# Patient Record
Sex: MALE | Race: WHITE
[De-identification: names, ages, dates, MRNs, and addresses within clinical notes are randomized per-mention and may not be internally consistent; named-entity substitution may affect disease eponyms.]

---

## 2018-03-04 ENCOUNTER — HOSPITAL ENCOUNTER (INPATIENT)
Dept: HOSPITAL 17 - NEPE | Age: 73
LOS: 3 days | Discharge: HOME | DRG: 308 | End: 2018-03-07
Attending: FAMILY MEDICINE | Admitting: FAMILY MEDICINE
Payer: MEDICARE

## 2018-03-04 VITALS
RESPIRATION RATE: 20 BRPM | HEART RATE: 92 BPM | DIASTOLIC BLOOD PRESSURE: 109 MMHG | SYSTOLIC BLOOD PRESSURE: 171 MMHG | TEMPERATURE: 96.5 F | OXYGEN SATURATION: 97 %

## 2018-03-04 VITALS — BODY MASS INDEX: 31.44 KG/M2 | WEIGHT: 207.45 LBS | HEIGHT: 68 IN

## 2018-03-04 VITALS
RESPIRATION RATE: 18 BRPM | HEART RATE: 86 BPM | OXYGEN SATURATION: 96 % | TEMPERATURE: 97.7 F | SYSTOLIC BLOOD PRESSURE: 159 MMHG | DIASTOLIC BLOOD PRESSURE: 94 MMHG

## 2018-03-04 VITALS
HEART RATE: 88 BPM | OXYGEN SATURATION: 97 % | RESPIRATION RATE: 20 BRPM | SYSTOLIC BLOOD PRESSURE: 151 MMHG | DIASTOLIC BLOOD PRESSURE: 67 MMHG

## 2018-03-04 VITALS — OXYGEN SATURATION: 96 %

## 2018-03-04 VITALS
SYSTOLIC BLOOD PRESSURE: 121 MMHG | TEMPERATURE: 97.9 F | HEART RATE: 63 BPM | OXYGEN SATURATION: 96 % | DIASTOLIC BLOOD PRESSURE: 55 MMHG | RESPIRATION RATE: 18 BRPM

## 2018-03-04 VITALS
DIASTOLIC BLOOD PRESSURE: 79 MMHG | SYSTOLIC BLOOD PRESSURE: 152 MMHG | HEART RATE: 88 BPM | RESPIRATION RATE: 18 BRPM | OXYGEN SATURATION: 98 %

## 2018-03-04 VITALS
RESPIRATION RATE: 20 BRPM | DIASTOLIC BLOOD PRESSURE: 91 MMHG | HEART RATE: 81 BPM | OXYGEN SATURATION: 97 % | SYSTOLIC BLOOD PRESSURE: 146 MMHG

## 2018-03-04 VITALS
TEMPERATURE: 97.7 F | SYSTOLIC BLOOD PRESSURE: 154 MMHG | HEART RATE: 85 BPM | RESPIRATION RATE: 20 BRPM | OXYGEN SATURATION: 94 % | DIASTOLIC BLOOD PRESSURE: 91 MMHG

## 2018-03-04 VITALS — OXYGEN SATURATION: 98 %

## 2018-03-04 VITALS — HEART RATE: 86 BPM

## 2018-03-04 DIAGNOSIS — Z88.0: ICD-10-CM

## 2018-03-04 DIAGNOSIS — I47.2: ICD-10-CM

## 2018-03-04 DIAGNOSIS — E11.9: ICD-10-CM

## 2018-03-04 DIAGNOSIS — Z96.651: ICD-10-CM

## 2018-03-04 DIAGNOSIS — I11.0: ICD-10-CM

## 2018-03-04 DIAGNOSIS — Z96.642: ICD-10-CM

## 2018-03-04 DIAGNOSIS — N28.1: ICD-10-CM

## 2018-03-04 DIAGNOSIS — Z79.84: ICD-10-CM

## 2018-03-04 DIAGNOSIS — Z82.49: ICD-10-CM

## 2018-03-04 DIAGNOSIS — H35.30: ICD-10-CM

## 2018-03-04 DIAGNOSIS — M51.36: ICD-10-CM

## 2018-03-04 DIAGNOSIS — I48.91: Primary | ICD-10-CM

## 2018-03-04 DIAGNOSIS — I50.23: ICD-10-CM

## 2018-03-04 DIAGNOSIS — I42.8: ICD-10-CM

## 2018-03-04 DIAGNOSIS — J98.11: ICD-10-CM

## 2018-03-04 DIAGNOSIS — M48.00: ICD-10-CM

## 2018-03-04 DIAGNOSIS — R59.9: ICD-10-CM

## 2018-03-04 DIAGNOSIS — Z95.810: ICD-10-CM

## 2018-03-04 DIAGNOSIS — Z87.891: ICD-10-CM

## 2018-03-04 LAB
BASOPHILS # BLD AUTO: 0 TH/MM3 (ref 0–0.2)
BASOPHILS NFR BLD: 0.7 % (ref 0–2)
BUN SERPL-MCNC: 24 MG/DL (ref 7–18)
CALCIUM SERPL-MCNC: 9.2 MG/DL (ref 8.5–10.1)
CHLORIDE SERPL-SCNC: 107 MEQ/L (ref 98–107)
CREAT SERPL-MCNC: 1.33 MG/DL (ref 0.6–1.3)
EOSINOPHIL # BLD: 0.2 TH/MM3 (ref 0–0.4)
EOSINOPHIL NFR BLD: 3.2 % (ref 0–4)
ERYTHROCYTE [DISTWIDTH] IN BLOOD BY AUTOMATED COUNT: 13.3 % (ref 11.6–17.2)
GFR SERPLBLD BASED ON 1.73 SQ M-ARVRAT: 53 ML/MIN (ref 89–?)
GLUCOSE SERPL-MCNC: 161 MG/DL (ref 74–106)
HCO3 BLD-SCNC: 34 MEQ/L (ref 21–32)
HCT VFR BLD CALC: 44.5 % (ref 39–51)
HGB BLD-MCNC: 15.3 GM/DL (ref 13–17)
INR PPP: 1 RATIO
LYMPHOCYTES # BLD AUTO: 0.9 TH/MM3 (ref 1–4.8)
LYMPHOCYTES NFR BLD AUTO: 12.9 % (ref 9–44)
MCH RBC QN AUTO: 30.6 PG (ref 27–34)
MCHC RBC AUTO-ENTMCNC: 34.3 % (ref 32–36)
MCV RBC AUTO: 89.3 FL (ref 80–100)
MONOCYTE #: 0.7 TH/MM3 (ref 0–0.9)
MONOCYTES NFR BLD: 10.2 % (ref 0–8)
NEUTROPHILS # BLD AUTO: 5.2 TH/MM3 (ref 1.8–7.7)
NEUTROPHILS NFR BLD AUTO: 73 % (ref 16–70)
PLATELET # BLD: 156 TH/MM3 (ref 150–450)
PMV BLD AUTO: 9.3 FL (ref 7–11)
PROTHROMBIN TIME: 10.6 SEC (ref 9.8–11.6)
RBC # BLD AUTO: 4.98 MIL/MM3 (ref 4.5–5.9)
SODIUM SERPL-SCNC: 143 MEQ/L (ref 136–145)
TROPONIN I SERPL-MCNC: (no result) NG/ML (ref 0.02–0.05)
TROPONIN I SERPL-MCNC: 0.03 NG/ML (ref 0.02–0.05)
WBC # BLD AUTO: 7.1 TH/MM3 (ref 4–11)

## 2018-03-04 PROCEDURE — 84439 ASSAY OF FREE THYROXINE: CPT

## 2018-03-04 PROCEDURE — 82550 ASSAY OF CK (CPK): CPT

## 2018-03-04 PROCEDURE — 82948 REAGENT STRIP/BLOOD GLUCOSE: CPT

## 2018-03-04 PROCEDURE — 76700 US EXAM ABDOM COMPLETE: CPT

## 2018-03-04 PROCEDURE — 85027 COMPLETE CBC AUTOMATED: CPT

## 2018-03-04 PROCEDURE — 96374 THER/PROPH/DIAG INJ IV PUSH: CPT

## 2018-03-04 PROCEDURE — 94618 PULMONARY STRESS TESTING: CPT

## 2018-03-04 PROCEDURE — 80048 BASIC METABOLIC PNL TOTAL CA: CPT

## 2018-03-04 PROCEDURE — 78452 HT MUSCLE IMAGE SPECT MULT: CPT

## 2018-03-04 PROCEDURE — 82552 ASSAY OF CPK IN BLOOD: CPT

## 2018-03-04 PROCEDURE — 85025 COMPLETE CBC W/AUTO DIFF WBC: CPT

## 2018-03-04 PROCEDURE — 71045 X-RAY EXAM CHEST 1 VIEW: CPT

## 2018-03-04 PROCEDURE — A9502 TC99M TETROFOSMIN: HCPCS

## 2018-03-04 PROCEDURE — 71275 CT ANGIOGRAPHY CHEST: CPT

## 2018-03-04 PROCEDURE — 93005 ELECTROCARDIOGRAM TRACING: CPT

## 2018-03-04 PROCEDURE — 84484 ASSAY OF TROPONIN QUANT: CPT

## 2018-03-04 PROCEDURE — 83880 ASSAY OF NATRIURETIC PEPTIDE: CPT

## 2018-03-04 PROCEDURE — 93306 TTE W/DOPPLER COMPLETE: CPT

## 2018-03-04 PROCEDURE — 85610 PROTHROMBIN TIME: CPT

## 2018-03-04 PROCEDURE — 85730 THROMBOPLASTIN TIME PARTIAL: CPT

## 2018-03-04 PROCEDURE — 93017 CV STRESS TEST TRACING ONLY: CPT

## 2018-03-04 PROCEDURE — 84443 ASSAY THYROID STIM HORMONE: CPT

## 2018-03-04 RX ADMIN — INSULIN ASPART SCH: 100 INJECTION, SOLUTION INTRAVENOUS; SUBCUTANEOUS at 21:00

## 2018-03-04 RX ADMIN — TAMSULOSIN HYDROCHLORIDE SCH MG: 0.4 CAPSULE ORAL at 21:34

## 2018-03-04 RX ADMIN — ENOXAPARIN SODIUM SCH MG: 100 INJECTION SUBCUTANEOUS at 12:10

## 2018-03-04 RX ADMIN — METOPROLOL TARTRATE SCH MG: 100 TABLET, FILM COATED ORAL at 21:34

## 2018-03-04 RX ADMIN — METOPROLOL TARTRATE SCH MG: 25 TABLET, FILM COATED ORAL at 21:34

## 2018-03-04 RX ADMIN — INSULIN ASPART SCH: 100 INJECTION, SOLUTION INTRAVENOUS; SUBCUTANEOUS at 17:00

## 2018-03-04 RX ADMIN — INSULIN ASPART SCH: 100 INJECTION, SOLUTION INTRAVENOUS; SUBCUTANEOUS at 12:00

## 2018-03-04 RX ADMIN — Medication SCH ML: at 21:35

## 2018-03-04 NOTE — PD
Data


Data


Last Documented VS





Vital Signs








  Date Time  Temp Pulse Resp B/P (MAP) Pulse Ox O2 Delivery O2 Flow Rate FiO2


 


3/4/18 06:54     98 Nasal Cannula 2.00 


 


3/4/18 06:39  88 18     


 


3/4/18 06:25 96.5       








Orders





 Orders


Electrocardiogram (3/4/18 06:42)


Complete Blood Count With Diff (3/4/18 06:42)


Basic Metabolic Panel (Bmp) (3/4/18 06:42)


Ckmb (Isoenzyme) Profile (3/4/18 06:42)


Troponin I (3/4/18 06:42)


Chest, Single Ap (3/4/18 06:42)


Iv Access Insert/Monitor (3/4/18 06:42)


Ecg Monitoring (3/4/18 06:42)


Oxygen Administration (3/4/18 06:42)


Oximetry (3/4/18 06:42)


B-Type Natriuretic Peptide (3/4/18 06:42)


CKMB (3/4/18 07:00)


CKMB% (3/4/18 07:00)


Ct Pulmonary Angiogram (3/4/18 08:19)


Furosemide Inj (Lasix Inj) (3/4/18 08:30)


Iohexol 350 Inj (Omnipaque 350 Inj) (3/4/18 06:22)


Admit Order (Ed Use Only) (3/4/18 )


Cardiac Monitor / Telemetry NEELA.Q8H (3/4/18 08:49)


Vital Signs (Adult) Q4H (3/4/18 08:49)


Diet Heart Healthy (3/4/18 Breakfast)


Activity Oob With Assistance (3/4/18 08:49)





Labs





Laboratory Tests








Test


  3/4/18


07:00


 


White Blood Count 7.1 TH/MM3 


 


Red Blood Count 4.98 MIL/MM3 


 


Hemoglobin 15.3 GM/DL 


 


Hematocrit 44.5 % 


 


Mean Corpuscular Volume 89.3 FL 


 


Mean Corpuscular Hemoglobin 30.6 PG 


 


Mean Corpuscular Hemoglobin


Concent 34.3 % 


 


 


Red Cell Distribution Width 13.3 % 


 


Platelet Count 156 TH/MM3 


 


Mean Platelet Volume 9.3 FL 


 


Neutrophils (%) (Auto) 73.0 % 


 


Lymphocytes (%) (Auto) 12.9 % 


 


Monocytes (%) (Auto) 10.2 % 


 


Eosinophils (%) (Auto) 3.2 % 


 


Basophils (%) (Auto) 0.7 % 


 


Neutrophils # (Auto) 5.2 TH/MM3 


 


Lymphocytes # (Auto) 0.9 TH/MM3 


 


Monocytes # (Auto) 0.7 TH/MM3 


 


Eosinophils # (Auto) 0.2 TH/MM3 


 


Basophils # (Auto) 0.0 TH/MM3 


 


CBC Comment DIFF FINAL 


 


Differential Comment  


 


Blood Urea Nitrogen 24 MG/DL 


 


Creatinine 1.33 MG/DL 


 


Random Glucose 161 MG/DL 


 


Calcium Level 9.2 MG/DL 


 


Sodium Level 143 MEQ/L 


 


Potassium Level 4.2 MEQ/L 


 


Chloride Level 107 MEQ/L 


 


Carbon Dioxide Level 34.0 MEQ/L 


 


Anion Gap 2 MEQ/L 


 


Estimat Glomerular Filtration


Rate 53 ML/MIN 


 


 


Total Creatine Kinase 111 U/L 


 


Creatine Kinase MB 1.5 NG/ML 


 


Troponin I


  LESS THAN 0.02


NG/ML


 


B-Type Natriuretic Peptide 230 PG/ML 











MDM


Medical Record Reviewed:  Yes


Supervised Visit with SHIV:  No


Narrative Course


Please refer to the outgoing provider note:





The patient will be admitted for treatment of CHF, new diagnosis for him he 

also has new A. fib with a normal rate.  The time of reassessment he was 

somewhat dyspneic at rest.  Lasix ordered.  A CT pulmonary injury was also 

ordered which shows no PE however there is compressive atelectasis at the right 

base.





CBC & BMP Diagram


3/4/18 07:00








Calcium Level 9.2








Last Impressions








CT Angiography 3/4/18 0819 Signed





Impressions: 





 Service Date/Time:  Sunday, March 4, 2018 08:43 - CONCLUSION:  Marked 

elevation 





 right hemidiaphragm with compressive atelectasis right base. Negative for 





 central pulmonary emboli Nonspecific adenopathy.      Valerio Singh MD  FACR


 


Chest X-Ray 3/4/18 0642 Signed





Impressions: 





 Service Date/Time:  Sunday, March 4, 2018 06:48 - CONCLUSION:  Stable 





 cardiomegaly.  The lungs are clear.     Segundo Patterson MD 





Case discussed with family medicine resident for Dr Ellis


Diagnosis





 Primary Impression:  


 CHF (congestive heart failure)


 Qualified Codes:  I50.9 - Heart failure, unspecified


 Additional Impression:  


 Atrial fibrillation


 Qualified Codes:  I48.91 - Unspecified atrial fibrillation





Admitting Information


Admitting Physician Requests:  Observation











Antoine Wallace MD Mar 4, 2018 10:36

## 2018-03-04 NOTE — EKG
Date Performed: 03/04/2018       Time Performed: 06:38:05

 

PTAGE:      72 years

 

EKG:      ATRIAL FIBRILLATION WITH ABERRANT CONDUCTION OR VENTRICULAR PREMATURE COMPLEXES MARKED LEFT
 AXIS DEVIATION POSSIBLE ANTERIOR MYOCARDIAL INFARCTION ABNORMAL ECG

 

PREVIOUS TRACING       : 02/18/2016 19.58

 

DOCTOR:   Mansoor Mehta  Interpretating Date/Time  03/04/2018 09:02:11

## 2018-03-04 NOTE — RADRPT
EXAM DATE/TIME:  03/04/2018 06:48 

 

[facility] COMPARISON:     

CHEST SINGLE AP, February 18, 2016, 14:12.

 

                     

INDICATIONS :     

Shortness of breath.

                     

 

MEDICAL HISTORY :     

Hypertension.  Diabetes mellitus type II.        

 

SURGICAL HISTORY :     

None.   

 

ENCOUNTER:     

Initial                                        

 

ACUITY:     

1 day      

 

PAIN SCORE:     

0/10

 

LOCATION:     

Bilateral chest 

 

FINDINGS:     

A single view of the chest demonstrates the lungs to be symmetrically aerated without evidence of mas
s, infiltrate or effusion. Chronic elevation of the right hemidiaphragm. Heart is mildly enlarged but
 stable. Pacing device overlies the left chest. Osseous structures are intact. 

 

CONCLUSION:     

Stable cardiomegaly.  The lungs are clear.

 

 

 

 Segundo Patterson MD on March 04, 2018 at 6:59

## 2018-03-04 NOTE — ECHRPT
Indication:   heart failure

 

 CONCLUSIONS

 Normal left ventricular size. 

 The left ventricular systolic function is severely reduced with an estimated ejection fraction in th
e range of 

 25-30% Global hypokinesis. 

 

 Trace-to-mild mitral valve regurgitation. 

 

 

 There is mild tricuspid valve regurgitation. 

 The estimated pulmonary arterial pressure is 66.6 mmHg. 

 

 BP:        /         HR:                          Rhythm:

 

 MEASUREMENTS  (Male / Female) Normal Values       Technical Quality:Fair

 2D ECHO

 LV Diastolic Diameter PLAX        5.3 cm                4.2 - 5.9 / 3.9 - 5.3 cm

 LV Systolic Diameter PLAX         4.8 cm                

 IVS Diastolic Thickness           2.1 cm                0.6 - 1.0 / 0.6 - 0.9 cm

 LVPW Diastolic Thickness          1.2 cm                0.6 - 1.0 / 0.6 - 0.9 cm

 LV Relative Wall Thickness        0.6                   

 RV Internal Dim ED PLAX           3.5 cm                

 

 M-MODE

 Aortic Root Diameter MM           4.8 cm                

 LA Systolic Diameter MM           4.5 cm                

 LA Ao Ratio MM                    0.9                   

 AV Cusp Separation MM             2.6 cm                

 

 DOPPLER

 LV E' Lateral Velocity            10.5 cm/s             

 LV E' Septal Velocity             8.9 cm/s              

 TR Peak Velocity                  376.0 cm/s            

 TR Peak Gradient                  56.6 mmHg             

 Right Atrial Pressure             10.0 mmHg             

 Pulmonary Artery Systolic Pressu  66.6 mmHg             

 Right Ventricular Systolic Press  66.6 mmHg             

 

 

 FINDINGS

 

 LEFT VENTRICLE

 Normal left ventricular size. 

 The left ventricular systolic function is severely reduced with an estimated ejection fraction in th
e range of 

 25-30%. 

 

 RIGHT VENTRICLE

 Normal right ventricular size and systolic function.  

 

 LEFT ATRIUM

 The left atrial size is normal.  

 

 RIGHT ATRIUM

 The right atrial size is normal.  

 

 ATRIAL SEPTUM

 Normal atrial septal thickness without atrial level shunting by limited color doppler interrogation.
  

 

 AORTA

 The aortic root and proximal ascending aorta are normal in size on limited imaging.  

 

 MITRAL VALVE

 Structurally normal mitral valve. 

 Trace-to-mild mitral valve regurgitation. 

 

 AORTIC VALVE

 Trileaflet aortic valve. No aortic valve stenosis or regurgitation. 

 

 TRICUSPID VALVE

 Structurally normal tricuspid valve. 

 There is mild tricuspid valve regurgitation. 

 The estimated pulmonary arterial pressure is 66.6 mmHg. 

 

 PULMONARY VALVE

 No pulmonary valve regurgitation or stenosis.  

 

 VESSELS

 The inferior vena cava is normal in size.  

 

 PERICARDIUM

 No pericardial effusion.  

 

 

 

 

  Brandy Herzog MD, FACC

  (Electronically Signed)

  Final Date:04 March 2018 15:09

## 2018-03-04 NOTE — RADRPT
EXAM DATE/TIME:  03/04/2018 19:11 

 

[facility] COMPARISON:     

No previous studies available for comparison.

        

 

 

INDICATIONS :     

Abdominal distention.

                     

 

MEDICAL HISTORY :     

Congestive heart failure. Hypertension.   Cardiomyopathy. Anticoagulant therapy. Irregular heartbeat.
 Degernerative disc disease. Spinal stenosis. Diabetes. Elevated liver enzymes.

 

SURGICAL HISTORY :     

Umbilical hernia repair. Hemorrhoidectomy.   Internal defibrillator. Bilateral cataract removal. Sali
vary gland surgery. Vasectomy. Left shoulder surgery. Spinal stenosis repair. Bilateral hand surgery.
 Left hip replacement. Right knee surgery. Stomach wall repair. Varicose vein surgery.

 

ENCOUNTER:     

Initial

 

ACUITY:     

1 day

 

PAIN SCORE:     

3/10

 

LOCATION:     

Bilateral upper quadrant 

                     

MEASUREMENTS:     

 

LIVER:     

15.3 cm length 

 

COMMON DUCT:     

Non-visualized

 

RIGHT KIDNEY:     

12.2 x 5.5 x 6.7 cm

 

LEFT KIDNEY:     

10.9 x 4.1 x 5.6 cm

 

SPLEEN:     

12.8  cm length

 

AORTA:     

1.7cm maximal      

 

FINDINGS:     

Exam is limited by bowel gas. Pancreas, common bile duct, aorta, IVC and portions of the liver are no
t visualized. Multiple right renal cysts measuring up to 6.5 cm upper pole and 5.5 cm lower pole. No 
hydronephrosis. Left kidney unremarkable. No focal liver abnormalities. Portal vein flowed normal dir
ection.

 

CONCLUSION:     

1. Limited exam due to gaseous bowel. No acute findings.

Multiple right renal cysts. 

 

 

 Brayden Suárez MD on March 04, 2018 at 21:43           

Board Certified Radiologist.

 This report was verified electronically.

## 2018-03-04 NOTE — RADRPT
EXAM DATE/TIME:  03/04/2018 08:43 

 

[facility] COMPARISON:     

No previous studies available for comparison.

 

 

INDICATIONS :     

Shortness of breath.

                      

 

IV CONTRAST:     

74 cc Omnipaque 350 (iohexol) IV 

 

 

RADIATION DOSE:     

22.66 CTDIvol (mGy) 

 

 

MEDICAL HISTORY :     

Cardiovascular disease. Hypertension. Diabetes mellitus type 2.

 

SURGICAL HISTORY :      

Pacemaker. 

 

ENCOUNTER:      

Initial

 

ACUITY:      

1 day

 

PAIN SCALE:      

0/10

 

LOCATION:       

Bilateral chest 

 

TECHNIQUE:     

Volumetric scanning of the chest was performed using a pulmonary embolism protocol MIP images were re
constructed.  Using automated exposure control and adjustment of the mA and/or kV according to patien
t size, radiation dose was kept as low as reasonably achievable to obtain optimal diagnostic quality 
images.   DICOM format image data is available electronically for review and comparison.  

 

Follow-up recommendations for detected pulmonary nodules are based at a minimum on nodule size and pa
tient risk factors according to Fleischner Society Guidelines.

 

FINDINGS:     

There is elevation of the right hemidiaphragm with colon, liver and small bowel in the right chest.  
There is compressive atelectasis in the right base because of this.

The heart is enlarged mild interstitial edema.  Moderate artery calcifications are noted.  There is n
o pericardial effusion.

There is no central pulmonary emboli.

There is axillary adenopathy on the left the largest node measuring 1.6 cm.  There is nonspecific sma
ll mediastinal nodes present.

Upper abdominal contents visualized are unremarkable

Degenerative changes thoracic spine.

 

CONCLUSION:     

Marked elevation right hemidiaphragm with compressive atelectasis right base.

Negative for central pulmonary emboli

Nonspecific adenopathy.

 

 

 

 

 Valerio Singh MD FACR on March 04, 2018 at 8:56           

Board Certified Radiologist.

 This report was verified electronically.

## 2018-03-04 NOTE — HHI.HP
HPI


Service


Family Medicine


Primary Care Physician


Non-Staff


Admission Diagnosis





CHF; AFib


Diagnoses:  


International Travel<30 Days:  No


Contact w/Intl Traveler<30days:  No


Known Affected Area:  No


History of Present Illness


Patient is a 72- year-old Male with PMHx of non-ischemic cardiomyopathy, CHF, 

HTN, and DM presented to the ED with 1 wk h/o SOB associated with inability to 

lay down. He stated he thought it was going to get better and since it did not 

improved he came to the ED for evaluation. Patient reports nothing made the SOB 

better. Denies any increased SOB with ambulation. Patient and wife live in 

[place] and spend 4 months out of the year in [place]. He is not 

established with any cardiologist in the area. He last saw his cardiologist (

Dr. Ray tel:[phone]) in [place] in Dec. 2017 and was told everything 

was good. Echo done at that time showed EF 50% and was told to f/u in a yr. 

Denies fever, chill, CP, N/V, palpitations, or dizziness. Patient states his 

brother-in-law was sick but he denies any cold-like sxs. 


 


Of note patient stated that last time he had SOB with laying down occurred in 

 after he had Left hip surgery, at that time he ended up requiring ICU care 

for 16 days and found to have an EF of 10%. Patient stated that he was told the 

cardiomyopathy was due to a virus and was diagnosed with  non ischemic 

cardiomyopathy. Patient also reports that during that time he had a pacer-

defibrillator placed by Dr. Smith, which was replaced Summer 2017. Denies any 

prior hx of atrial fibrillation.





Patient takes promethazine for allergies. 


 












































 (Saskia Garcia MD, R1)





Review of Systems


Constitutional:  COMPLAINS OF: Fatigue (chronic), Weight gain (10lbs), DENIES: 

Fever, Weight loss, Chills, Dizziness, Change in appetite


Eyes:  DENIES: Blurred vision, Eye pain


Ears, nose, mouth, throat:  DENIES: Vertigo, Nasal discharge, Throat pain, 

Running Nose, Sinus Pain


Respiratory:  COMPLAINS OF: Cough (chronic in  the am due to allergies), 

Shortness of breath, DENIES: Hemoptysis


Cardiovascular:  DENIES: Chest pain, Palpitations, Lower Extremity Edema


Gastrointestinal:  COMPLAINS OF: Abdominal pain (diastatis in stomach, chronic )

, DENIES: Bloody stools, Constipation, Diarrhea, Nausea, Vomiting


Genitourinary:  DENIES: Urinary frequency, Dysuria


Musculoskeletal:  COMPLAINS OF: Joint pain (R. knee pain, 2016 chronic), Back 

pain (L4-5 degenerative disc, spinal stenosis), DENIES: Muscle aches


Integumentary:  DENIES: Rash


Hematologic/lymphatic:  DENIES: Bruising


Neurologic:  DENIES: Headache, Localized weakness, Seizures


Psychiatric:  DENIES: Confusion (Saskia Garcia MD, R1)





Past Family Social History


Past Medical History


CHF


HTN


DM, range A1C 6.5


spinal stenosis 


degenerative disc l4-l5


adult onset macular degenerative


Past Surgical History


Left hip 


Right knee, 2016


Reported Medications





Reported Meds & Active Scripts


Active


Reported


Metformin (Metformin HCl) 1,000 Mg Tab 1,000 Mg PO DAILY


     With a meal


Losartan-Hydrochlorothiazide 100-25 Mg Tab 1 Tab PO DAILY


Flomax (Tamsulosin HCl) 0.4 Mg Cap 0.4 Mg PO HS


Coreg (Carvedilol) 25 Mg Tab 25 Mg PO BID


B-12 (Cyanocobalamin) 2,000 Mcg Tab Unknown Dose PO DAILY


Aspirin 81 Mg Chew 81 Mg CHEW DAILY


 (Saskia Garcia MD, R1)


Allergies:  


Coded Allergies:  


     penicillin G (Unverified  Allergy, Severe, SYNCOPE, 17)


Family History


mom- [place]  at 82 yo


dad- coronary thrombosis, alcoholic,  at 56yo


brother- CHF


Social History


Patient lives with wife, they are from [place] and come [place] for 4 

months out of the year. They are currently staying live in [facility] 

for 4 months out of the years


retired, 


smoke, quit 30 yr ago, 24 smoked 2 packs per day, in addition to pipe tobacco


alcohol: occassinal, once a wk


illicit drug: none





decreased appetite  


 (Saskia Garcia MD, R1)





Physical Exam


Vital Signs





Vital Signs








  Date Time  Temp Pulse Resp B/P (MAP) Pulse Ox O2 Delivery O2 Flow Rate FiO2


 


3/4/18 06:54     98 Nasal Cannula 2.00 


 


3/4/18 06:54     98 Nasal Cannula 2.00 


 


3/4/18 06:39  88 18 152/79 (103) 98 Nasal Cannula 2.00 


 


3/4/18 06:35   20  94 Room Air  


 


3/4/18 06:25 96.5 92 20 171/109 (456) 97   








Physical Exam


GENERAL: This is a well-nourished, well-developed patient, in no apparent 

distress, sitting in chair. 


SKIN: No rashes, ecchymoses. Cool and dry. Small healing lesion on dorsum of 

Left foot.


HEAD: Atraumatic. Normocephalic.


EYES: Pupils equal round and reactive. Extraocular motions intact. No scleral 

icterus. No injection or drainage. 


ENT: Nose without bleeding, purulent drainage or septal hematoma. Throat 

without erythema, tonsillar hypertrophy or exudate. Uvula midline. Airway 

patent.


NECK: Trachea midline. No JVD or lymphadenopathy. Supple, nontender, no 

meningeal signs. No hepato


CARDIOVASCULAR: Irregular rate and rhythm without murmurs, gallops, or rubs. 


RESPIRATORY: Clear to auscultation. Decreased breath sound on Right lower base. 

No wheezes, rales, or rhonchi.  


GASTROINTESTINAL: Abdomen distended, hard. unable to appreciate fluid shift on 

exam. No hepato-splenomegaly, or palpable masses. No guarding. no hepatojugular 

reflux noted.


MUSCULOSKELETAL: Extremities without clubbing, or cyanosis. Mild Right LE edema 

noted.  No joint tenderness, effusion, or edema noted. No calf tenderness. 

Negative Homans sign bilaterally. 


NEUROLOGICAL: Awake and alert. Cranial nerves II through XII intact.  Motor and 

sensory grossly within normal limits. Five out of 5 muscle strength in all 

muscle groups.  Normal speech.


Laboratory





Laboratory Tests








Test


  3/4/18


07:00


 


White Blood Count 7.1 


 


Red Blood Count 4.98 


 


Hemoglobin 15.3 


 


Hematocrit 44.5 


 


Mean Corpuscular Volume 89.3 


 


Mean Corpuscular Hemoglobin 30.6 


 


Mean Corpuscular Hemoglobin


Concent 34.3 


 


 


Red Cell Distribution Width 13.3 


 


Platelet Count 156 


 


Mean Platelet Volume 9.3 


 


Neutrophils (%) (Auto) 73.0 


 


Lymphocytes (%) (Auto) 12.9 


 


Monocytes (%) (Auto) 10.2 


 


Eosinophils (%) (Auto) 3.2 


 


Basophils (%) (Auto) 0.7 


 


Neutrophils # (Auto) 5.2 


 


Lymphocytes # (Auto) 0.9 


 


Monocytes # (Auto) 0.7 


 


Eosinophils # (Auto) 0.2 


 


Basophils # (Auto) 0.0 


 


CBC Comment DIFF FINAL 


 


Differential Comment  


 


Blood Urea Nitrogen 24 


 


Creatinine 1.33 


 


Random Glucose 161 


 


Calcium Level 9.2 


 


Sodium Level 143 


 


Potassium Level 4.2 


 


Chloride Level 107 


 


Carbon Dioxide Level 34.0 


 


Anion Gap 2 


 


Estimat Glomerular Filtration


Rate 53 


 


 


Total Creatine Kinase 111 


 


Creatine Kinase MB 1.5 


 


Troponin I LESS THAN 0.02 


 


B-Type Natriuretic Peptide 230 








 (Saskia Garcia MD, R1)


Result Diagram:  


3/4/18 0700                                                                    

            3/4/18 07





Imaging





Last Impressions








CT Angiography 3/4/18 08 Signed





Impressions: 





 Service Date/Time:  2018 08:43 - CONCLUSION:  Marked 

elevation 





 right hemidiaphragm with compressive atelectasis right base. Negative for 





 central pulmonary emboli Nonspecific adenopathy.      Valerio Singh MD  FACR


 


Chest X-Ray 3/4/18 0642 Signed





Impressions: 





 Service Date/Time:  2018 06:48 - CONCLUSION:  Stable 





 cardiomegaly.  The lungs are clear.     Segundo Patterson MD 








 (Saskia Garcia MD, R1)





Caprini VTE Risk Assessment


Caprini VTE Risk Assessment:  Mod/High Risk (score >= 2)


Caprini Risk Assessment Model











 Point Value = 1          Point Value = 2  Point Value = 3  Point Value = 5


 


Age 41-60


Minor surgery


BMI > 25 kg/m2


Swollen legs


Varicose veins


Pregnancy or postpartum


History of unexplained or recurrent


   spontaneous 


Oral contraceptives or hormone


   replacement


Sepsis (< 1 month)


Serious lung disease, including


   pneumonia (< 1 month)


Abnormal pulmonary function


Acute myocardial infarction


Congestive heart failure (< 1 month)


History of inflammatory bowel disease


Medical patient at bed rest Age 61-74


Arthroscopic surgery


Major open surgery (> 45 min)


Laparoscopic surgery (> 45 min)


Malignancy


Confined to bed (> 72 hours)


Immobilizing plaster cast


Central venous access Age >= 75


History of VTE


Family history of VTE


Factor V Leiden


Prothrombin 23556A


Lupus anticoagulant


Anticardiolipin antibodies


Elevated serum homocysteine


Heparin-induced thrombocytopenia


Other congenital or acquired


   thrombophilia Stroke (< 1 month)


Elective arthroplasty


Hip, pelvis, or leg fracture


Acute spinal cord injury (< 1 month)








Prophylaxis Regimen











   Total Risk


Factor Score Risk Level Prophylaxis Regimen


 


0-1      Low Early ambulation


 


2 Moderate Order ONE of the following:


*Sequential Compression Device (SCD)


*Heparin 5000 units SQ BID


 


3-4 Higher Order ONE of the following medications:


*Heparin 5000 units SQ TID


*Enoxaparin/Lovenox 40 mg SQ daily (WT < 150 kg, CrCl > 30 mL/min)


*Enoxaparin/Lovenox 30 mg SQ daily (WT < 150 kg, CrCl > 10-29 mL/min)


*Enoxaparin/Lovenox 30 mg SQ BID (WT < 150 kg, CrCl > 30 mL/min)


AND/OR


*Sequential Compression Device (SCD)


 


5 or more Highest Order ONE of the following medications:


*Heparin 5000 units SQ TID (Preferred with Epidurals)


*Enoxaparin/Lovenox 40 mg SQ daily (WT < 150 kg, CrCl > 30 mL/min)


*Enoxaparin/Lovenox 30 mg SQ daily (WT < 150 kg, CrCl > 10-29 mL/min)


*Enoxaparin/Lovenox 30 mg SQ BID (WT < 150 kg, CrCl > 30 mL/min)


AND


*Sequential Compression Device (SCD)








 (Saskia Garcia MD, R1)





Assessment and Plan


Assessment and Plan


Patient is a 72- year-old Male with PMHx of non-ischemic cardiomyopathy, CHF, 

HTN, and DM presented to the ED with 1 wk h/o SOB associated with orthopnea. 

Patient found to be in rate controlled new onset atrial fibrillation on EKG. 

Admitted for further w/u of SOB CHF exacerbation vs. atrial fib. Pt is stable 

with slight elevation of BP, O2 saturation of 97 on 2 L NC, other VS WNL. No 

signs of infection.


Code Status


Full code


Discussed Condition With


[facility] Dr. Corey Ellis


 (Saskia Garcia MD, R1)


Attending Attestation


Pt. was seen and examined after discussing with Patria Garcia and Corey.  He is 

from [facility] and has not check his glucose since the end of December when 

he came to [place] for 4 months.  He used to keep a spread sheet and his last 

accucheck in [place] was 120 and his A1c was reportedly 6.6.  He has an defibrillator

/AICD as of just over year ago when it was replaced for battery failure.  Pt. 

report no hx of atrial fibrillation.  Has been having orthopnea recently, and 

his pants have felt too tight.  Thinks he may have gained at least 10 lb.  I 

agree with the exam as noted above.


Patient seen and examined.  Case reviewed and discussed with the resident team.

  Agree with plan of care as discussed with me and documented in the resident 

note.


 (Barb Ellis MD)


Problem List:  


(1) Shortness of breath


ICD Codes:  R06.02 - Shortness of breath


Status:  Acute


Plan:  Patient with c/o 1 wk h/o SOB associated with orthopnea.


-Pt has PMHx of CHF, HTN and non-ischemic cardiomyopathy with pacer-

defibrillator in place. Denies history of afib. 


-Mild Right LE edema, abdominal distention noted on exam. Denies palpitation or 

dizziness. Reports improvement of SOB on 2L NC. 


DDX: CHF exacerbation, A. Fib, PE, [place]





No leukocytosis, troponin x1 neg, 


EKG: Atrial Fibrillation with aberrant conduction or ventricular premature 

complexes. Left axis deviation unchanged from previous EKG in 2016


CXR: Stable cardiomegaly, lungs are clear


CTA: Marked elevation right hemidiaphragm with compressive atelectasis right 

base. Negative for central pulmonary emboli and specific adenopathy.





-c/w asa


-Pt placed on telemetry


-f/u Echo, abdominal u/s, TSH, free T4


-f/u serial troponin and EKG


-Patient with a CHADSVASC SCORE: 4, consider anticoagulation therapy once 

reports from echo are back


-Cardiology consulted, appreciate recommendations





(2) CHF (congestive heart failure)


ICD Codes:  I50.9 - Heart failure, unspecified


Status:  Chronic


Plan:  c/w home medication, coreg 





see chest pain plan above





(3) Hypertension


ICD Codes:  I10 - Essential (primary) hypertension


Status:  Chronic


Plan:  c/w home meds


monitor VS


 





(4) DM (diabetes mellitus)


ICD Codes:  E11.9 - Type 2 diabetes mellitus without complications


Status:  Chronic


Plan:  Patient has not been checking his BG for the past wk. 


B on admission





Bedside glucose chks


low dose sliding scale


Last A1C 6.5, per patient


continue to monitor





(5) History of cardiomyopathy


ICD Codes:  Z86.79 - Personal history of other diseases of the circulatory 

system


Status:  Chronic


Plan:  Patient with past medical history of nonischemic cardiomyopathy


Cardiomegaly noted on chest x-ray





-See plan above for SOB





(6) Nutrition, metabolism, and development symptoms


ICD Codes:  R63.8 - Other symptoms and signs concerning food and fluid intake


Plan:  Fluids: will hold off at this point


Electrolytes: replete as needed, will continue to monitor


Nutrition: heart healthy





DVT ppx: therapeutic Lovenox given


 (Saskia Garcia MD, R1)





Problem Qualifiers





(1) CHF (congestive heart failure):  


Qualified Codes:  I50.9 - Heart failure, unspecified


(2) DM (diabetes mellitus):  








Saskia Garcia MD, R1 Mar 4, 2018 09:22


Barb Ellis MD Mar 4, 2018 19:33

## 2018-03-04 NOTE — PD
HPI


Chief Complaint:  Respiratory Distress


Time Seen by Provider:  06:47


Travel History


International Travel<30 days:  No


Contact w/Intl Traveler<30days:  No


Traveled to known affect area:  No





History of Present Illness


HPI


72-year-old male complains of shortness of breath.  Patient states that his 

symptoms started a week ago and got progressively worse since then.  Patient 

states that the shortness of it is worse with exertion and lying down at night.

  Patient denies any coughing congestion fever chills.  Patient denies any 

chest pain.  Patient denies abdominal pain.  Patient denies any focal weakness 

or numbness of the extremity.  Patient had a history of cardiomyopathy.  

Patient states that his ejection fraction was 47%.  Patient states that he is 

not on diuretic.  Patient states that he had increase in swelling of lower 

extremity recently.





PFSH


Past Medical History


Hx Anticoagulant Therapy:  Yes (ASA 81 PO DAILY)


Heart Rhythm Problems:  Yes (NON-ISCHEMIC)


Cardiomyopathy:  Yes


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 )


Diabetes:  Yes (Metformin)


Patient Takes Glucophage:  Yes


Diminished Hearing:  No


Gastrointestinal Disorders:  Yes (ELEVATED LFT)


Hypertension:  Yes


Musculoskeletal:  Yes (DDD)


Immunizations Current:  Yes


Influenza Vaccination:  No





Past Surgical History


Abdominal Surgery:  Yes (UMBILICAL HERNIA REPAIRED)


AICD:  Yes ([employer] VR DEBRILLATOR. SERIAL#[id number], MODEL#[id number])


Body Medical Devices:  [employer] VR DEFIB


Cardiac Surgery:  Yes (DEFIBRILLATOR PLACEMENT)


Eye Surgery:  Yes (BILAT CATARACTS)


Genitourinary Surgery:  Yes (VASECTOMY)


Joint Replacement:  Yes ([facility])


Oral Surgery:  Yes (SALIVARY GLAND)


Other Surgery:  Yes (HEMORROIDECTOMY,STOMACH WALL REPAIR,VARICOSE VEIN )





Social History


Alcohol Use:  Yes (OCC)


Tobacco Use:  No


Substance Use:  No





Allergies-Medications


(Allergen,Severity, Reaction):  


Coded Allergies:  


     penicillin G (Unverified  Allergy, Severe, SYNCOPE, 8/16/17)


Reported Meds & Prescriptions





Reported Meds & Active Scripts


Active


Reported


B-12 (Cyanocobalamin) 2,000 Mcg Tab Unknown Dose PO DAILY


Aspirin 81 Mg Chew 81 Mg CHEW DAILY








Review of Systems


General / Constitutional:  No: Fever


Eyes:  No: Visual changes


HENT:  No: Headaches


Cardiovascular:  No: Chest Pain or Discomfort


Respiratory:  Positive: Shortness of Breath


Gastrointestinal:  No: Abdominal Pain


Genitourinary:  No: Dysuria


Musculoskeletal:  No: Pain


Skin:  No Rash


Neurologic:  No: Weakness


Psychiatric:  No: Depression


Endocrine:  No: Polydipsia


Hematologic/Lymphatic:  No: Easy Bruising





Physical Exam


Narrative


GENERAL: Well-nourished, well-developed patient.


SKIN: Focused skin assessment warm/dry.


HEAD: Normocephalic.


EYES: No scleral icterus. No injection or drainage. 


NECK: Supple, trachea midline. No JVD or lymphadenopathy.


CARDIOVASCULAR: Regular rate and rhythm without murmurs, gallops, or rubs. 


RESPIRATORY: Breath sounds equal bilaterally. No accessory muscle use.


GASTROINTESTINAL: Abdomen soft, non-tender, nondistended. 


MUSCULOSKELETAL: No cyanosis.  Patient had +1 pitting edema lower extremity.


BACK: Nontender without obvious deformity. No CVA tenderness.


Neurologic exam normal.





Data


Data


Last Documented VS





Vital Signs








  Date Time  Temp Pulse Resp B/P (MAP) Pulse Ox O2 Delivery O2 Flow Rate FiO2


 


3/4/18 06:39  88 18 152/79 (103) 98 Nasal Cannula 2.00 


 


3/4/18 06:25 96.5       








Orders





 Orders


Electrocardiogram (3/4/18 06:42)


Complete Blood Count With Diff (3/4/18 06:42)


Basic Metabolic Panel (Bmp) (3/4/18 06:42)


Ckmb (Isoenzyme) Profile (3/4/18 06:42)


Troponin I (3/4/18 06:42)


Chest, Single Ap (3/4/18 06:42)


Iv Access Insert/Monitor (3/4/18 06:42)


Ecg Monitoring (3/4/18 06:42)


Oxygen Administration (3/4/18 06:42)


Oximetry (3/4/18 06:42)


B-Type Natriuretic Peptide (3/4/18 06:42)








MDM


Medical Decision Making


Medical Screen Exam Complete:  Yes


Emergency Medical Condition:  Yes


Differential Diagnosis


Differential diagnosis including CHF, bronchitis, pneumonia, PE, pneumothorax.


Narrative Course


72-year-old male with increasing shortness of breath and lower extremity edema 

for the past week.  History of cardiomyopathy.  History of reduced ejection 

fraction of 47%.











Ben Fernandez MD Mar 4, 2018 06:54

## 2018-03-05 VITALS
OXYGEN SATURATION: 95 % | DIASTOLIC BLOOD PRESSURE: 81 MMHG | RESPIRATION RATE: 18 BRPM | SYSTOLIC BLOOD PRESSURE: 155 MMHG | TEMPERATURE: 97.7 F | HEART RATE: 72 BPM

## 2018-03-05 VITALS
SYSTOLIC BLOOD PRESSURE: 136 MMHG | OXYGEN SATURATION: 95 % | RESPIRATION RATE: 20 BRPM | TEMPERATURE: 97.5 F | DIASTOLIC BLOOD PRESSURE: 82 MMHG | HEART RATE: 76 BPM

## 2018-03-05 VITALS
RESPIRATION RATE: 20 BRPM | OXYGEN SATURATION: 95 % | DIASTOLIC BLOOD PRESSURE: 77 MMHG | TEMPERATURE: 97.5 F | SYSTOLIC BLOOD PRESSURE: 156 MMHG | HEART RATE: 70 BPM

## 2018-03-05 VITALS — OXYGEN SATURATION: 91 %

## 2018-03-05 VITALS
RESPIRATION RATE: 17 BRPM | SYSTOLIC BLOOD PRESSURE: 151 MMHG | TEMPERATURE: 97.3 F | DIASTOLIC BLOOD PRESSURE: 79 MMHG | HEART RATE: 80 BPM | OXYGEN SATURATION: 95 %

## 2018-03-05 VITALS
DIASTOLIC BLOOD PRESSURE: 88 MMHG | SYSTOLIC BLOOD PRESSURE: 143 MMHG | HEART RATE: 80 BPM | TEMPERATURE: 97.8 F | RESPIRATION RATE: 17 BRPM | OXYGEN SATURATION: 97 %

## 2018-03-05 VITALS
HEART RATE: 85 BPM | OXYGEN SATURATION: 96 % | RESPIRATION RATE: 19 BRPM | TEMPERATURE: 98.5 F | DIASTOLIC BLOOD PRESSURE: 86 MMHG | SYSTOLIC BLOOD PRESSURE: 166 MMHG

## 2018-03-05 VITALS — HEART RATE: 76 BPM

## 2018-03-05 VITALS — HEART RATE: 83 BPM

## 2018-03-05 VITALS — HEART RATE: 71 BPM

## 2018-03-05 VITALS — OXYGEN SATURATION: 94 %

## 2018-03-05 LAB
BASOPHILS # BLD AUTO: 0 TH/MM3 (ref 0–0.2)
BASOPHILS NFR BLD: 0.6 % (ref 0–2)
BUN SERPL-MCNC: 27 MG/DL (ref 7–18)
CALCIUM SERPL-MCNC: 8.6 MG/DL (ref 8.5–10.1)
CHLORIDE SERPL-SCNC: 107 MEQ/L (ref 98–107)
CREAT SERPL-MCNC: 1.22 MG/DL (ref 0.6–1.3)
EOSINOPHIL # BLD: 0.2 TH/MM3 (ref 0–0.4)
EOSINOPHIL NFR BLD: 3.1 % (ref 0–4)
ERYTHROCYTE [DISTWIDTH] IN BLOOD BY AUTOMATED COUNT: 13.2 % (ref 11.6–17.2)
GFR SERPLBLD BASED ON 1.73 SQ M-ARVRAT: 58 ML/MIN (ref 89–?)
GLUCOSE SERPL-MCNC: 121 MG/DL (ref 74–106)
HCO3 BLD-SCNC: 32.6 MEQ/L (ref 21–32)
HCT VFR BLD CALC: 42.3 % (ref 39–51)
HGB BLD-MCNC: 14.6 GM/DL (ref 13–17)
LYMPHOCYTES # BLD AUTO: 0.8 TH/MM3 (ref 1–4.8)
LYMPHOCYTES NFR BLD AUTO: 14.6 % (ref 9–44)
MCH RBC QN AUTO: 30.6 PG (ref 27–34)
MCHC RBC AUTO-ENTMCNC: 34.5 % (ref 32–36)
MCV RBC AUTO: 88.9 FL (ref 80–100)
MONOCYTE #: 0.5 TH/MM3 (ref 0–0.9)
MONOCYTES NFR BLD: 10 % (ref 0–8)
NEUTROPHILS # BLD AUTO: 3.9 TH/MM3 (ref 1.8–7.7)
NEUTROPHILS NFR BLD AUTO: 71.7 % (ref 16–70)
PLATELET # BLD: 132 TH/MM3 (ref 150–450)
PMV BLD AUTO: 9.2 FL (ref 7–11)
RBC # BLD AUTO: 4.76 MIL/MM3 (ref 4.5–5.9)
SODIUM SERPL-SCNC: 144 MEQ/L (ref 136–145)
WBC # BLD AUTO: 5.5 TH/MM3 (ref 4–11)

## 2018-03-05 RX ADMIN — ENOXAPARIN SODIUM SCH MG: 100 INJECTION SUBCUTANEOUS at 13:14

## 2018-03-05 RX ADMIN — POTASSIUM CHLORIDE SCH MEQ: 750 TABLET, FILM COATED, EXTENDED RELEASE ORAL at 10:17

## 2018-03-05 RX ADMIN — FUROSEMIDE SCH MG: 20 TABLET ORAL at 10:16

## 2018-03-05 RX ADMIN — POTASSIUM CHLORIDE SCH MEQ: 750 TABLET, FILM COATED, EXTENDED RELEASE ORAL at 21:55

## 2018-03-05 RX ADMIN — METOPROLOL TARTRATE SCH MG: 100 TABLET, FILM COATED ORAL at 21:55

## 2018-03-05 RX ADMIN — INSULIN ASPART SCH: 100 INJECTION, SOLUTION INTRAVENOUS; SUBCUTANEOUS at 12:00

## 2018-03-05 RX ADMIN — FUROSEMIDE SCH MG: 20 TABLET ORAL at 18:20

## 2018-03-05 RX ADMIN — INSULIN ASPART SCH: 100 INJECTION, SOLUTION INTRAVENOUS; SUBCUTANEOUS at 17:00

## 2018-03-05 RX ADMIN — ENOXAPARIN SODIUM SCH MG: 100 INJECTION SUBCUTANEOUS at 00:31

## 2018-03-05 RX ADMIN — ASPIRIN 81 MG SCH MG: 81 TABLET ORAL at 09:37

## 2018-03-05 RX ADMIN — LOSARTAN POTASSIUM SCH MG: 50 TABLET, FILM COATED ORAL at 09:37

## 2018-03-05 RX ADMIN — INSULIN ASPART SCH: 100 INJECTION, SOLUTION INTRAVENOUS; SUBCUTANEOUS at 08:00

## 2018-03-05 RX ADMIN — METOPROLOL TARTRATE SCH MG: 100 TABLET, FILM COATED ORAL at 09:37

## 2018-03-05 RX ADMIN — Medication SCH ML: at 09:38

## 2018-03-05 RX ADMIN — Medication SCH ML: at 21:56

## 2018-03-05 RX ADMIN — METOPROLOL TARTRATE SCH MG: 25 TABLET, FILM COATED ORAL at 21:55

## 2018-03-05 RX ADMIN — TAMSULOSIN HYDROCHLORIDE SCH MG: 0.4 CAPSULE ORAL at 21:55

## 2018-03-05 RX ADMIN — METOPROLOL TARTRATE SCH MG: 25 TABLET, FILM COATED ORAL at 09:37

## 2018-03-05 RX ADMIN — INSULIN ASPART SCH: 100 INJECTION, SOLUTION INTRAVENOUS; SUBCUTANEOUS at 22:08

## 2018-03-05 NOTE — EKG
Date Performed: 03/04/2018       Time Performed: 18:48:39

 

PTAGE:      72 years

 

EKG:      ATRIAL FIBRILLATION MARKED LEFT AXIS DEVIATION POSSIBLE ANTERIOR MYOCARDIAL INFARCTION , OF
 INDETERMINATE AGE ABNORMAL ECG

 

PREVIOUS TRACING       : 03/04/2018 06.38 Since the prior tracing, there has been no significant oneill


 

DOCTOR:   Ke Roque  Interpretating Date/Time  03/05/2018 13:25:05

## 2018-03-05 NOTE — HHI.FPPN
Subjective


Remarks


Seen and examined at bedside. No acute events overnight. Patient states that 

his shortness of breath is much improved, he is able to tolerate walking to the 

bathroom without any oxygenation.


 (Saskia Garcia MD, R1)





Objective


Vitals





Vital Signs








  Date Time  Temp Pulse Resp B/P (MAP) Pulse Ox O2 Delivery O2 Flow Rate FiO2


 


3/5/18 09:27     91   21


 


3/5/18 08:02 97.8 80 17 143/88 (106) 97   


 


3/5/18 04:00 97.5 75 20 156/77 (103) 95   


 


3/5/18 04:00      Nasal Cannula 2.00 


 


3/5/18 04:00  70      


 


3/5/18 02:46     94 Nasal Cannula 2.00 


 


3/5/18 00:00  61      


 


3/5/18 00:00 97.5 76 20 136/82 (100) 95   


 


3/5/18 00:00     95 Nasal Cannula 2.00 


 


3/4/18 20:00 97.7 85 20 154/91 (112) 94   


 


3/4/18 20:00     94 Nasal Cannula 2.00 


 


3/4/18 20:00  90      


 


3/4/18 16:00      Nasal Cannula 2.00 


 


3/4/18 15:13  86      


 


3/4/18 15:10      Nasal Cannula 2.00 


 


3/4/18 15:00 97.9 63 18 121/55 (77) 96   


 


3/4/18 14:12     96   


 


3/4/18 13:28 97.7 86 18 159/94 (115) 96   


 


3/4/18 13:00  88 20 151/67 (95) 97 Nasal Cannula 2.00 














I/O      


 


 3/4/18 3/4/18 3/4/18 3/5/18 3/5/18 3/5/18





 07:00 15:00 23:00 07:00 15:00 23:00


 


Intake Total   120 ml 240 ml  


 


Output Total  1000 ml  900 ml  


 


Balance  -1000 ml 120 ml -660 ml  


 


      


 


Intake Oral   120 ml 240 ml  


 


Output Urine Total  1000 ml  900 ml  


 


# Voids  1 1   








 (Saskia Garcia MD, R1)


Result Diagram:  


3/5/18 0520                                                                    

            3/5/18 0520





Imaging





Last Impressions








CT Angiography 3/4/18 0819 Signed





Impressions: 





 Service Date/Time:  2018 08:43 - CONCLUSION:  Marked 

elevation 





 right hemidiaphragm with compressive atelectasis right base. Negative for 





 central pulmonary emboli Nonspecific adenopathy.      Valerio Singh MD  FACR


 


Chest X-Ray 3/4/18 0642 Signed





Impressions: 





 Service Date/Time:  2018 06:48 - CONCLUSION:  Stable 





 cardiomegaly.  The lungs are clear.     Segundo Patterson MD 


 


Abdomen Ultrasound 3/4/18 0000 Signed





Impressions: 





 Service Date/Time:  2018 19:11 - CONCLUSION:  1. Limited exam 





 due to gaseous bowel. No acute findings. Multiple right renal cysts.     

Brayden Suárez MD 








Objective Remarks


GENERAL: This is a well-nourished, well-developed patient, in no apparent 

distress, sitting in on bed. 


SKIN: No rashes, ecchymoses. Cool and dry. Small healing lesion on dorsum of 

Left foot.


HEAD: Atraumatic. Normocephalic.


EYES: Extraocular motions intact. No scleral icterus. No injection or drainage. 


ENT: Nose without bleeding, purulent drainage or septal hematoma. 


NECK: Trachea midline. No JVD or lymphadenopathy. Supple, nontender, no 

meningeal signs. No hepato


CARDIOVASCULAR: Irregular rate and rhythm without murmurs, gallops, or rubs. 


RESPIRATORY: Clear to auscultation BL. No wheezes, rales, or rhonchi.  


GASTROINTESTINAL: Abdomen distended, hard. unable to appreciate fluid shift on 

exam. No hepato-splenomegaly, or palpable masses. No guarding. no hepatojugular 

reflux noted.


MUSCULOSKELETAL: Extremities without clubbing, or cyanosis. improved Right LE 

edema.  No joint tenderness, effusion, or edema noted. No calf tenderness. 

Negative Homans sign bilaterally. 


NEUROLOGICAL: Awake and alert. Cranial nerves II through XII intact.  Motor and 

sensory grossly within normal limits. Five out of 5 muscle strength in all 

muscle groups.  Normal speech.


 (Saskia Garcia MD, R1)





A/P


Assessment and Plan


Patient is a 72- year-old Male with PMHx of non-ischemic cardiomyopathy, CHF, 

HTN, and DM presented to the ED with 1 wk h/o SOB associated with orthopnea. 

Patient found to be in rate controlled new onset atrial fibrillation on EKG. 

Admitted for further w/u of SOB CHF exacerbation vs. atrial fib. Pt is stable 

with slight elevation of BP, O2 saturation of 97 on 2 L NC, other VS WNL. No 

signs of infection.   


 (Saskia Garcia MD, R1)


Attending Attestation


Patient comfortably lying in bed when he was examined by myself along with the 

entire medicine team this morning, states he has been up to the bathroom 

without oxygen.  He has been seen by the cardiologist and there is apparently a 

plan for him to contact his insurance company to find out what tier Eliquis, 

Plavix and Pradaxa are on.  We note that his ejection fraction has diminished 

from what he reported as of 2017.  Plan to discuss the possibility of 

Entresto with the cardiologist.  Physical exam is as noted above.


Patient seen and examined.  Case reviewed and discussed with the resident team.

  Agree with plan of care as discussed with me and documented in the resident 

note.


 (Barb Ellis MD)


Problem List:  


(1) Shortness of breath


ICD Codes:  R06.02 - Shortness of breath


Status:  Acute


Plan:  Patient with c/o 1 wk h/o SOB associated with orthopnea. Orthopnea has 

resolved and SOB has improved significantly per patient


-Pt has PMHx of CHF, HTN and non-ischemic cardiomyopathy with pacer-

defibrillator in place. Denies history of afib. 


-Mild Right LE edema, abdominal distention noted on exam. Denies palpitation or 

dizziness. Reports improvement of SOB on 2L NC. 


DDX: CHF exacerbation, A. Fib, PE, MI





No leukocytosis, troponin x2 neg, 


EKG: Atrial Fibrillation with aberrant conduction or ventricular premature 

complexes. Left axis deviation unchanged from previous EKG in 2016


CXR: Stable cardiomegaly, lungs are clear


CTA: Marked elevation right hemidiaphragm with compressive atelectasis right 

base. Negative for central pulmonary emboli and specific adenopathy.


Echo: EF of 25-30%, global hypokinesis


Abdominal u/s: No acute findings, multiple right renal cyst


TSH and free T4 WNL 





-c/w asa


-Pt placed on telemetry


-Patient with a CHADSVASC SCORE: 4





-Cardiology consulted, appreciate recommendations


-HZTZ discontinued and lasix added


-Patient to continue on Lovenox until he calls insurance to find out what type 

of anticoagulation is covered


-Will touch base with Dr. Herzog about considering adding entresto to 

medication regimen for improvement of HF








(2) CHF (congestive heart failure)


ICD Codes:  I50.9 - Heart failure, unspecified


Status:  Chronic


Plan:  c/w home medication, coreg 





see chest pain plan above





(3) Hypertension


ICD Codes:  I10 - Essential (primary) hypertension


Status:  Chronic


Plan:  c/w home meds


monitor VS


 





(4) DM (diabetes mellitus)


ICD Codes:  E11.9 - Type 2 diabetes mellitus without complications


Status:  Chronic


Plan:  Patient has not been checking his BG for the past wk. 


B on admission


BG of 121 this morning 





Bedside glucose chks


low dose sliding scale


Last A1C 6.5, per patient


continue to monitor





(5) History of cardiomyopathy


ICD Codes:  Z86.79 - Personal history of other diseases of the circulatory 

system


Status:  Chronic


Plan:  Patient with past medical history of nonischemic cardiomyopathy


Cardiomegaly noted on chest x-ray





-See plan above for SOB





(6) Nutrition, metabolism, and development symptoms


ICD Codes:  R63.8 - Other symptoms and signs concerning food and fluid intake


Plan:  Fluids: will hold off at this point


Electrolytes: replete as needed, will continue to monitor


Nutrition: heart healthy





DVT ppx: therapeutic Lovenox given


 (Saskia Garcia MD, R1)





Problem Qualifiers





(1) CHF (congestive heart failure):  


Qualified Codes:  I50.9 - Heart failure, unspecified


(2) DM (diabetes mellitus):  








Saskia Garcia MD, R1 Mar 5, 2018 11:55


Barb Ellis MD Mar 5, 2018 14:11

## 2018-03-05 NOTE — PD.CARD.PN
Subjective


Subjective Remarks


Pt without complaints





Objective


Medications





Current Medications








 Medications


  (Trade)  Dose


 Ordered  Sig/Carolee


 Route  Start Time


 Stop Time Status Last Admin


 


  (NS Flush)  2 ml  BID


 IV FLUSH  3/4/18 21:00


    3/4/18 21:35


 


 


  (NS Flush)  2 ml  UNSCH  PRN


 IV FLUSH  3/4/18 10:15


     


 


 


  (Aspirin Chew)  81 mg  DAILY


 CHEW  3/5/18 09:00


     


 


 


  (Flomax)  0.4 mg  HS


 PO  3/4/18 21:00


    3/4/18 21:34


 


 


  (Hydrodiuril)  25 mg  DAILY


 PO  3/5/18 09:00


     


 


 


  (D50w (Vial) Inj)  50 ml  UNSCH  PRN


 IV PUSH  3/4/18 10:30


     


 


 


  (Glucagon Inj)  1 mg  UNSCH  PRN


 OTHER  3/4/18 10:30


     


 


 


  (NovoLOG


 SUPPLEMENTAL


 SCALE)  1  ACHS SLIDING  SCALE


 SQ  3/4/18 12:00


     


 


 


  (Lovenox Inj)  97 mg  Q12H


 SQ  3/4/18 12:00


    3/5/18 00:31


 


 


  (Cozaar)  50 mg  DAILY


 PO  3/5/18 09:00


     


 


 


  (Lopressor)  100 mg  Q12HR


 PO  3/4/18 21:00


    3/4/18 21:34


 


 


  (Lopressor)  25 mg  Q12HR


 PO  3/4/18 21:00


    3/4/18 21:34


 








Vital Signs / I&O





Vital Signs








  Date Time  Temp Pulse Resp B/P (MAP) Pulse Ox O2 Delivery O2 Flow Rate FiO2


 


3/5/18 08:02 97.8 80 17 143/88 (106) 97   


 


3/5/18 04:00 97.5 75 20 156/77 (103) 95   


 


3/5/18 04:00      Nasal Cannula 2.00 


 


3/5/18 04:00  70      


 


3/5/18 02:46     94 Nasal Cannula 2.00 


 


3/5/18 00:00  61      


 


3/5/18 00:00 97.5 76 20 136/82 (100) 95   


 


3/5/18 00:00     95 Nasal Cannula 2.00 


 


3/4/18 20:00 97.7 85 20 154/91 (112) 94   


 


3/4/18 20:00     94 Nasal Cannula 2.00 


 


3/4/18 20:00  90      


 


3/4/18 16:00      Nasal Cannula 2.00 


 


3/4/18 15:13  86      


 


3/4/18 15:10      Nasal Cannula 2.00 


 


3/4/18 15:00 97.9 63 18 121/55 (77) 96   


 


3/4/18 14:12     96   


 


3/4/18 13:28 97.7 86 18 159/94 (115) 96   


 


3/4/18 13:00  88 20 151/67 (95) 97 Nasal Cannula 2.00 


 


3/4/18 10:00  81 20 146/91 (109) 97 Nasal Cannula 2.00 














I/O      


 


 3/4/18 3/4/18 3/4/18 3/5/18 3/5/18 3/5/18





 07:00 15:00 23:00 07:00 15:00 23:00


 


Intake Total   120 ml 240 ml  


 


Output Total  1000 ml  900 ml  


 


Balance  -1000 ml 120 ml -660 ml  


 


      


 


Intake Oral   120 ml 240 ml  


 


Output Urine Total  1000 ml  900 ml  


 


# Voids  1 1   








Physical Exam


GENERAL: Well developed, well nourished. No acute distress.


HEENT: Jugular venous pressure is normal. 


CHEST: Lungs clear to auscultation bilaterally. Unlabored respiratory effort.


CARDIAC: irregular rate and rhythm without S3, S4, or murmur.


ABDOMEN: Soft, nontender, no hepatosplenomegaly. Bowel sounds present.


EXTREMITIES: No clubbing, cyanosis, or edema.


Laboratory





Laboratory Tests








Test


  3/4/18


10:30 3/4/18


13:05 3/4/18


21:44 3/5/18


05:20


 


Prothrombin Time 10.6 SEC    


 


Prothromb Time International


Ratio 1.0 RATIO 


  


  


  


 


 


Activated Partial


Thromboplast Time 22.4 SEC 


  


  


  


 


 


Troponin I


  


  LESS THAN 0.02


NG/ML 0.03 NG/ML 


  


 


 


Thyroid Stimulating Hormone


3rd Gen 


  


  1.080 uIU/ML 


  


 


 


White Blood Count    5.5 TH/MM3 


 


Red Blood Count    4.76 MIL/MM3 


 


Hemoglobin    14.6 GM/DL 


 


Hematocrit    42.3 % 


 


Mean Corpuscular Volume    88.9 FL 


 


Mean Corpuscular Hemoglobin    30.6 PG 


 


Mean Corpuscular Hemoglobin


Concent 


  


  


  34.5 % 


 


 


Red Cell Distribution Width    13.2 % 


 


Platelet Count    132 TH/MM3 


 


Mean Platelet Volume    9.2 FL 


 


Neutrophils (%) (Auto)    71.7 % 


 


Lymphocytes (%) (Auto)    14.6 % 


 


Monocytes (%) (Auto)    10.0 % 


 


Eosinophils (%) (Auto)    3.1 % 


 


Basophils (%) (Auto)    0.6 % 


 


Neutrophils # (Auto)    3.9 TH/MM3 


 


Lymphocytes # (Auto)    0.8 TH/MM3 


 


Monocytes # (Auto)    0.5 TH/MM3 


 


Eosinophils # (Auto)    0.2 TH/MM3 


 


Basophils # (Auto)    0.0 TH/MM3 


 


CBC Comment    DIFF FINAL 


 


Differential Comment     


 


Blood Urea Nitrogen    27 MG/DL 


 


Creatinine    1.22 MG/DL 


 


Random Glucose    121 MG/DL 


 


Calcium Level    8.6 MG/DL 


 


Sodium Level    144 MEQ/L 


 


Potassium Level    3.8 MEQ/L 


 


Chloride Level    107 MEQ/L 


 


Carbon Dioxide Level    32.6 MEQ/L 


 


Anion Gap    4 MEQ/L 


 


Estimat Glomerular Filtration


Rate 


  


  


  58 ML/MIN 


 








Imaging





Last 72 hours Impressions








CT Angiography 3/4/18 0819 Signed





Impressions: 





 Service Date/Time:  Sunday, March 4, 2018 08:43 - CONCLUSION:  Marked 

elevation 





 right hemidiaphragm with compressive atelectasis right base. Negative for 





 central pulmonary emboli Nonspecific adenopathy.      Valerio Singh MD  FACR


 


Chest X-Ray 3/4/18 0642 Signed





Impressions: 





 Service Date/Time:  Sunday, March 4, 2018 06:48 - CONCLUSION:  Stable 





 cardiomegaly.  The lungs are clear.     Segundo Patterson MD 


 


Abdomen Ultrasound 3/4/18 0000 Signed





Impressions: 





 Service Date/Time:  Sunday, March 4, 2018 19:11 - CONCLUSION:  1. Limited exam 





 due to gaseous bowel. No acute findings. Multiple right renal cysts.     

Brayden Suárez MD 











Assessment and Plan


Problem List:  


(1) Atrial fibrillation


ICD Codes:  I48.91 - Unspecified atrial fibrillation


Status:  Acute


Plan:  new onset AF


- rate controlled with metoprolol


-elevated CHADS VASC, on lovenox until he calls insurance to see what is covered


- 2 day jessi nuc as he had breakfast





(2) Systolic heart failure


ICD Codes:  I50.20 - Unspecified systolic (congestive) heart failure


Plan:  EF 25-30% by ECHO


-stop HCTZ and start lasix


-on BB and ARB





(3) Nonischemic cardiomyopathy


ICD Codes:  I42.8 - Other cardiomyopathies


(4) DM (diabetes mellitus)


ICD Codes:  E11.9 - Type 2 diabetes mellitus without complications


Status:  Chronic


(5) Hypertension


ICD Codes:  I10 - Essential (primary) hypertension


Status:  Chronic


(6) Shortness of breath


ICD Codes:  R06.02 - Shortness of breath


Status:  Acute





Problem Qualifiers





(1) Atrial fibrillation:  


Qualified Codes:  I48.91 - Unspecified atrial fibrillation


(2) DM (diabetes mellitus):  








Brandy Herzog MD Mar 5, 2018 08:50

## 2018-03-05 NOTE — MB
cc:

Brandy Herzog MD

****

 

 

DATE OF CONSULT:

03/04/2018

 

REASON FOR CONSULTATION:

New onset atrial fibrillation.

 

HISTORY OF PRESENT ILLNESS:

Mr. Santacruz is a 72-year-old man who does have a remote history of a 

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  He 

reports that he had some progressive shortness of breath and 

orthopnea.  He subsequently came to the emergency room and was found 

to be in atrial fibrillation with rapid ventricular rate.  The patient

other than the weight gain and shortness of breath denies any chest 

pain.  He has had some lower extremity edema.

 

PAST MEDICAL HISTORY:

Significant for the viral cardiomyopathy.  His initial EF was 10%.  

The patient reports that his most recent EF was 40-50%.  He is status 

post gen change with [employer] ICD. He also has a history of 

hypertension, diabetes, congestive heart failure, degenerative disc 

disease, concussion, hemorrhoid surgery, left hip replacement, right 

knee replacement, elevated bilirubin, rhinoplasty, salivary gland 

surgery, shoulder surgery, spinal stenosis, splenic wall repair, 

umbilical hernia repair,varicose vein surgery, vasectomy.

 

OUTPATIENT MEDICATIONS:

   1. Aspirin 81 mg a day

   2. B12

   3. Vitamins

   4. Coreg 25 mg q. 12

   5. Flomax 0.4 mg daily

   6. Losartan hydrochlorothiazide _____  daily

   7. Metformin 1000 mg daily

   8. Promethazine p.r.n.

 

FAMILY HISTORY:

Includes myocardial infarction.

 

SOCIAL HISTORY:

The patient is a former smoker and is .

 

REVIEW OF SYSTEMS:

Except as mentioned in the history of present illness, all 12-systems 

are negative.

 

PHYSICAL EXAMINATION:

VITAL SIGNS:  81, 20, 146/91.

GENERAL:  He is an obese man, who is in no apparent distress.

NECK:  His neck is free from JVD.

LUNGS:  Bilaterally clear to auscultation.

CARDIOVASCULAR:  He has a normal S1 and S2.  I did not appreciate any 

murmurs, rubs or gallops.

ABDOMEN:  Soft.

EXTREMITIES: Do still have a mild amount of edema.  Of note, there is 

a liter of urine in the urinal at the bedside.

 

LABORATORY DATA:

Lab values significant for a creatinine of 1.33, glucose of 161, BNP 

of 230, troponin less than 0.02 and free TSH of 1.3.

 

IMAGING STUDIES:

Chest x-ray shows clear lungs.

 

TELEMETRY:

Does show atrial fibrillation with a controlled ventricular rate at 85

beats a minute.

 

IMPRESSION:

New onset atrial fibrillation - the patient does have a CHADS vas 

score of 4 with a point for over 65, hypertension, diabetes and 

congestive heart failure.  We discussed anticoagulation with Coumadin 

versus the new oral agents.  He would like to go with a new agent 

provided it is covered by his insurance.  He is going to check on 

this.  He does understand that Pradaxa is the only one with a reversal

agent and the other newer agents do not have an antidote.  He will 

undergo echocardiogram and stress testing.  This may be considered as 

an outpatient depending on the ability to schedule.  The patient will 

be changed from Coreg to metoprolol for improved rate control.

 

Heart failure - this is an acute on chronic presentation exacerbated 

by his atrial fibrillation with RVR.  He has diuresed quite a bit but 

does still have some edema.  As his rate is controlled I suspect we 

will be able to continue him only on his hydrochlorothiazide.  Of 

course this will be pending the echo evaluation of his EF.            

 

 

Viral cardiomyopathy - as above I would continue him on a beta-blocker

and his ARB.

 

Thank you for allowing me the opportunity to participate in his care.

 

 

 

__________________________________

MD GAIL Pineda/JENNIFER/alexia

D: 03/04/2018, 11:20 AM

T: 03/04/2018, 11:57 AM

Visit #: [id number]

Job #: [id number]

## 2018-03-06 VITALS
DIASTOLIC BLOOD PRESSURE: 83 MMHG | TEMPERATURE: 97.3 F | RESPIRATION RATE: 20 BRPM | OXYGEN SATURATION: 97 % | SYSTOLIC BLOOD PRESSURE: 147 MMHG | HEART RATE: 89 BPM

## 2018-03-06 VITALS
DIASTOLIC BLOOD PRESSURE: 76 MMHG | HEART RATE: 98 BPM | OXYGEN SATURATION: 96 % | TEMPERATURE: 97.6 F | RESPIRATION RATE: 20 BRPM | SYSTOLIC BLOOD PRESSURE: 147 MMHG

## 2018-03-06 VITALS
TEMPERATURE: 97.9 F | RESPIRATION RATE: 20 BRPM | DIASTOLIC BLOOD PRESSURE: 89 MMHG | OXYGEN SATURATION: 96 % | SYSTOLIC BLOOD PRESSURE: 153 MMHG | HEART RATE: 80 BPM

## 2018-03-06 VITALS
RESPIRATION RATE: 18 BRPM | DIASTOLIC BLOOD PRESSURE: 95 MMHG | OXYGEN SATURATION: 96 % | SYSTOLIC BLOOD PRESSURE: 162 MMHG | HEART RATE: 86 BPM | TEMPERATURE: 98.2 F

## 2018-03-06 VITALS — OXYGEN SATURATION: 96 %

## 2018-03-06 VITALS
RESPIRATION RATE: 20 BRPM | OXYGEN SATURATION: 95 % | TEMPERATURE: 98 F | DIASTOLIC BLOOD PRESSURE: 85 MMHG | SYSTOLIC BLOOD PRESSURE: 147 MMHG | HEART RATE: 69 BPM

## 2018-03-06 VITALS — HEART RATE: 85 BPM

## 2018-03-06 VITALS — HEART RATE: 78 BPM

## 2018-03-06 LAB
BUN SERPL-MCNC: 28 MG/DL (ref 7–18)
CALCIUM SERPL-MCNC: 8.7 MG/DL (ref 8.5–10.1)
CHLORIDE SERPL-SCNC: 106 MEQ/L (ref 98–107)
CREAT SERPL-MCNC: 1.13 MG/DL (ref 0.6–1.3)
ERYTHROCYTE [DISTWIDTH] IN BLOOD BY AUTOMATED COUNT: 13.1 % (ref 11.6–17.2)
GFR SERPLBLD BASED ON 1.73 SQ M-ARVRAT: 64 ML/MIN (ref 89–?)
GLUCOSE SERPL-MCNC: 90 MG/DL (ref 74–106)
HCO3 BLD-SCNC: 33 MEQ/L (ref 21–32)
HCT VFR BLD CALC: 42.6 % (ref 39–51)
HGB BLD-MCNC: 14.5 GM/DL (ref 13–17)
MCH RBC QN AUTO: 30.2 PG (ref 27–34)
MCHC RBC AUTO-ENTMCNC: 34 % (ref 32–36)
MCV RBC AUTO: 88.8 FL (ref 80–100)
PLATELET # BLD: 130 TH/MM3 (ref 150–450)
PMV BLD AUTO: 9.3 FL (ref 7–11)
RBC # BLD AUTO: 4.79 MIL/MM3 (ref 4.5–5.9)
SODIUM SERPL-SCNC: 144 MEQ/L (ref 136–145)
WBC # BLD AUTO: 5.8 TH/MM3 (ref 4–11)

## 2018-03-06 RX ADMIN — ENOXAPARIN SODIUM SCH MG: 100 INJECTION SUBCUTANEOUS at 00:39

## 2018-03-06 RX ADMIN — INSULIN ASPART SCH: 100 INJECTION, SOLUTION INTRAVENOUS; SUBCUTANEOUS at 08:00

## 2018-03-06 RX ADMIN — METOPROLOL TARTRATE SCH MG: 25 TABLET, FILM COATED ORAL at 21:00

## 2018-03-06 RX ADMIN — INSULIN ASPART SCH: 100 INJECTION, SOLUTION INTRAVENOUS; SUBCUTANEOUS at 11:41

## 2018-03-06 RX ADMIN — ENOXAPARIN SODIUM SCH MG: 100 INJECTION SUBCUTANEOUS at 11:40

## 2018-03-06 RX ADMIN — FUROSEMIDE SCH MG: 20 TABLET ORAL at 08:25

## 2018-03-06 RX ADMIN — Medication SCH ML: at 08:25

## 2018-03-06 RX ADMIN — Medication SCH ML: at 21:00

## 2018-03-06 RX ADMIN — LOSARTAN POTASSIUM SCH MG: 50 TABLET, FILM COATED ORAL at 08:25

## 2018-03-06 RX ADMIN — METOPROLOL TARTRATE SCH MG: 100 TABLET, FILM COATED ORAL at 21:02

## 2018-03-06 RX ADMIN — INSULIN ASPART SCH: 100 INJECTION, SOLUTION INTRAVENOUS; SUBCUTANEOUS at 16:52

## 2018-03-06 RX ADMIN — POTASSIUM CHLORIDE SCH MEQ: 750 TABLET, FILM COATED, EXTENDED RELEASE ORAL at 21:00

## 2018-03-06 RX ADMIN — FUROSEMIDE SCH MG: 20 TABLET ORAL at 16:51

## 2018-03-06 RX ADMIN — TAMSULOSIN HYDROCHLORIDE SCH MG: 0.4 CAPSULE ORAL at 21:01

## 2018-03-06 RX ADMIN — METOPROLOL TARTRATE SCH MG: 25 TABLET, FILM COATED ORAL at 08:25

## 2018-03-06 RX ADMIN — APIXABAN SCH MG: 5 TABLET, FILM COATED ORAL at 21:01

## 2018-03-06 RX ADMIN — METOPROLOL TARTRATE SCH MG: 100 TABLET, FILM COATED ORAL at 08:25

## 2018-03-06 RX ADMIN — ASPIRIN 81 MG SCH MG: 81 TABLET ORAL at 08:24

## 2018-03-06 RX ADMIN — INSULIN ASPART SCH: 100 INJECTION, SOLUTION INTRAVENOUS; SUBCUTANEOUS at 21:00

## 2018-03-06 RX ADMIN — POTASSIUM CHLORIDE SCH MEQ: 750 TABLET, FILM COATED, EXTENDED RELEASE ORAL at 08:25

## 2018-03-06 NOTE — HHI.DCPOC
Discharge Care Plan


Diagnosis:  


(1) CHF (congestive heart failure)


(2) Atrial fibrillation


(3) DM (diabetes mellitus)


(4) Ventricular tachycardia


Additional Problems


Discussed with patient to consider entresto medication for his systolic HF when 

he can be followed closely by his cardiologist back home in [place].


Goals to Promote Your Health


* To prevent worsening of your condition and complications


* To maintain your health at the optimal level


Directions to Meet Your Goals


*** Take your medications as prescribed


*** Follow your dietary instruction


*** Follow activity as directed








*** Keep your appointments as scheduled


*** Take your immunizations and boosters as scheduled


*** If your symptoms worsen call your PCP, if no PCP go to Urgent Care Center 

or Emergency Room***


*** Smoking is Dangerous to Your Health. Avoid second hand smoke***


***Call the 24-hour hour crisis hotline for domestic abuse at 1-268.552.7024***











Saskia Garcia MD, R1 Mar 6, 2018 15:50

## 2018-03-06 NOTE — PD.CARD.PN
Subjective


Subjective Remarks


Pt without complaints





Objective


Medications





Current Medications








 Medications


  (Trade)  Dose


 Ordered  Sig/Carolee


 Route  Start Time


 Stop Time Status Last Admin


 


  (NS Flush)  2 ml  BID


 IV FLUSH  3/4/18 21:00


    3/5/18 21:56


 


 


  (NS Flush)  2 ml  UNSCH  PRN


 IV FLUSH  3/4/18 10:15


     


 


 


  (Aspirin Chew)  81 mg  DAILY


 CHEW  3/5/18 09:00


    3/5/18 09:37


 


 


  (Flomax)  0.4 mg  HS


 PO  3/4/18 21:00


    3/5/18 21:55


 


 


  (D50w (Vial) Inj)  50 ml  UNSCH  PRN


 IV PUSH  3/4/18 10:30


     


 


 


  (Glucagon Inj)  1 mg  UNSCH  PRN


 OTHER  3/4/18 10:30


     


 


 


  (NovoLOG


 SUPPLEMENTAL


 SCALE)  1  ACHS SLIDING  SCALE


 SQ  3/4/18 12:00


    3/6/18 11:41


 


 


  (Lovenox Inj)  97 mg  Q12H


 SQ  3/4/18 12:00


    3/6/18 11:40


 


 


  (Cozaar)  50 mg  DAILY


 PO  3/5/18 09:00


    3/5/18 09:37


 


 


  (Lopressor)  100 mg  Q12HR


 PO  3/4/18 21:00


    3/5/18 21:55


 


 


  (Lopressor)  25 mg  Q12HR


 PO  3/4/18 21:00


    3/5/18 21:55


 


 


  (Lasix)  20 mg  BID@09,18


 PO  3/5/18 10:00


    3/5/18 18:20


 


 


  (KCl)  10 meq  Q12HR


 PO  3/5/18 10:00


    3/5/18 21:55


 








Vital Signs / I&O





Vital Signs








  Date Time  Temp Pulse Resp B/P (MAP) Pulse Ox O2 Delivery O2 Flow Rate FiO2


 


3/6/18 13:36     96 Nasal Cannula 2.00 


 


3/6/18 12:00 97.6 91 20 147/76 (99) 96   


 


3/6/18 12:00  98      


 


3/6/18 09:35      Nasal Cannula 2.00 


 


3/6/18 08:00  85      


 


3/6/18 04:00  78      


 


3/6/18 03:52 97.9 80 20 153/89 (110) 96   


 


3/6/18 00:00 98.0 78 20 147/85 (105) 95   


 


3/6/18 00:00  69      


 


3/5/18 20:37      Nasal Cannula 2.00 


 


3/5/18 20:00 98.5 85 19 166/86 (112) 96   


 


3/5/18 20:00      Nasal Cannula 2.00 


 


3/5/18 20:00  66      


 


3/5/18 16:02 97.7 72 18 155/81 (105) 95   


 


3/5/18 16:00  71      














I/O      


 


 3/5/18 3/5/18 3/5/18 3/6/18 3/6/18 3/6/18





 07:00 15:00 23:00 07:00 15:00 23:00


 


Intake Total 240 ml  600 ml   


 


Output Total 900 ml  800 ml 600 ml  


 


Balance -660 ml  -200 ml -600 ml  


 


      


 


Intake Oral 240 ml  600 ml   


 


Output Urine Total 900 ml  800 ml 600 ml  


 


# Bowel Movements   1   








Physical Exam


GENERAL: Well developed, well nourished. No acute distress.


HEENT: Jugular venous pressure is normal. 


CHEST: Lungs clear to auscultation bilaterally. Unlabored respiratory effort.


CARDIAC: irregular rate and rhythm without S3, S4, or murmur.


ABDOMEN: Soft, nontender, no hepatosplenomegaly. Bowel sounds present.


EXTREMITIES: No clubbing, cyanosis, or edema.


Laboratory





Laboratory Tests








Test


  3/6/18


05:21


 


White Blood Count 5.8 TH/MM3 


 


Red Blood Count 4.79 MIL/MM3 


 


Hemoglobin 14.5 GM/DL 


 


Hematocrit 42.6 % 


 


Mean Corpuscular Volume 88.8 FL 


 


Mean Corpuscular Hemoglobin 30.2 PG 


 


Mean Corpuscular Hemoglobin


Concent 34.0 % 


 


 


Red Cell Distribution Width 13.1 % 


 


Platelet Count 130 TH/MM3 


 


Mean Platelet Volume 9.3 FL 


 


Blood Urea Nitrogen 28 MG/DL 


 


Creatinine 1.13 MG/DL 


 


Random Glucose 90 MG/DL 


 


Calcium Level 8.7 MG/DL 


 


Sodium Level 144 MEQ/L 


 


Potassium Level 3.5 MEQ/L 


 


Chloride Level 106 MEQ/L 


 


Carbon Dioxide Level 33.0 MEQ/L 


 


Anion Gap 5 MEQ/L 


 


Estimat Glomerular Filtration


Rate 64 ML/MIN 


 








Imaging





Last 72 hours Impressions








Myocardial Perfusion Scan Nuc Med 3/5/18 0000 Signed





Impressions: 





 Service Date/Time:  Monday, March 5, 2018 14:36 - CONCLUSION:  No reversible 





 defects observed to suggest acute ischemia. Global hypokinesia with reduced 





 ejection fraction.  RISK CATEGORY:     High     Segundo Gillespie Jr., MD 


 


CT Angiography 3/4/18 0819 Signed





Impressions: 





 Service Date/Time:  Sunday, March 4, 2018 08:43 - CONCLUSION:  Marked 

elevation 





 right hemidiaphragm with compressive atelectasis right base. Negative for 





 central pulmonary emboli Nonspecific adenopathy.      Valerio Singh MD  FACR


 


Chest X-Ray 3/4/18 0642 Signed





Impressions: 





 Service Date/Time:  Sunday, March 4, 2018 06:48 - CONCLUSION:  Stable 





 cardiomegaly.  The lungs are clear.     Segundo Patterson MD 


 


Abdomen Ultrasound 3/4/18 0000 Signed





Impressions: 





 Service Date/Time:  Sunday, March 4, 2018 19:11 - CONCLUSION:  1. Limited exam 





 due to gaseous bowel. No acute findings. Multiple right renal cysts.     

Brayden Suárez MD 











Assessment and Plan


Problem List:  


(1) Ventricular tachycardia


ICD Codes:  I47.2 - Ventricular tachycardia


Plan:  has ICD, asymptomatic


-no ischemia on nuc


- increase BB





(2) Atrial fibrillation


ICD Codes:  I48.91 - Unspecified atrial fibrillation


Status:  Acute


Plan:  new onset AF


- rate controlled with metoprolol


-elevated CHADS VASC, on lovenox until he calls insurance to see what is 

covered => eliquis


- no ischemia on nuc


-[place]





(3) Systolic heart failure


ICD Codes:  I50.20 - Unspecified systolic (congestive) heart failure


Plan:  EF 25-30% by ECHO 28% by Nuc


-doing well on present meds


- consider entresto when he can be followed closely 


  he is going on a [place] in a few days





(4) Nonischemic cardiomyopathy


ICD Codes:  I42.8 - Other cardiomyopathies


(5) DM (diabetes mellitus)


ICD Codes:  E11.9 - Type 2 diabetes mellitus without complications


Status:  Chronic


(6) Hypertension


ICD Codes:  I10 - Essential (primary) hypertension


Status:  Chronic


(7) Shortness of breath


ICD Codes:  R06.02 - Shortness of breath


Status:  Acute





Problem Qualifiers





(1) Atrial fibrillation:  


Qualified Codes:  I48.91 - Unspecified atrial fibrillation


(2) Systolic heart failure:  


Qualified Codes:  I50.20 - Unspecified systolic (congestive) heart failure


(3) DM (diabetes mellitus):  








Brandy Herzog MD Mar 6, 2018 15:41

## 2018-03-06 NOTE — RADRPT
EXAM DATE/TIME:  03/05/2018 14:36 

 

[facility] COMPARISON:     

No previous studies available for comparison.

 

 

INDICATIONS :     

Chest pain for one day. Atrial  fibrillation. 

                           

 

DOSE:     

30.2 mCi Tc99m Myoview at stress.

                     30.1 mCi Tc99m Myoview at rest.

                     0.4 mg Lexiscan

                       

 

 

STRESS SYMPTOMS:      

Short of breath.

                       

 

 

EJECTION FRACTION:       

28%

                       

 

MEDICAL HISTORY :     

Hypertension. Diabetes mellitus type 2.  

 

SURGICAL HISTORY :      

Inguinal hernia repair.   

 

ENCOUNTER:     

Initial

 

ACUITY:     

1 day

 

PAIN SCALE:     

3/10

 

LOCATION:       

chest 

 

TECHNIQUE:     

The patient underwent pharmacologic stress with infusion of prescribed dose.  Continuous ECG tracing 
was monitored during stress.  Gated SPECT imaging was performed after stress and conventional SPECT i
maging was performed at rest.  The examination was performed on a SPECT/CT scanner, both attenuation 
and non-corrected datasets were reviewed.

 

FINDINGS:     

 

DISTRIBUTION:     

The maximum perfused segment at stress is in the anterolateral wall.

 

PERFUSION STUDY:     

No significant reversible defects observed. A fixed defect noted at the apex.

 

GATED STUDY:     

There is global hypokinesia. No dyskinetic segments observed. Significantly reduced ejection fraction
 at 28%.

 

CONCLUSION:     

No reversible defects observed to suggest acute ischemia. Global hypokinesia with reduced ejection fr
action.

 

RISK CATEGORY:     High

 

 

 

 Segundo Gillespie Jr., MD on March 06, 2018 at 9:50           

Board Certified Radiologist.

 This report was verified electronically.

## 2018-03-06 NOTE — HHI.FPPN
Subjective


Remarks


Patient seen and examined at bedside. No acute events overnight. Patient stated 

he had stress test done this morning. Patient states his shortness of breath 

has greatly improved. Denies any other symptoms. He also stated he was able to 

speak with his insurance and they will cover Plavix non-generic. 


Nurse reported that earlier in the morning patient had a 16 beat run of V. tach 

around 9:50am, patient was asymptomatic at that time, vital signs stable, nurse 

stated she will be contacting cardiology. Patient asymptomatic during our 

examination. 


 (Saskia Garcia MD, R1)





Objective


Vitals





Vital Signs








  Date Time  Temp Pulse Resp B/P (MAP) Pulse Ox O2 Delivery O2 Flow Rate FiO2


 


3/6/18 09:35      Nasal Cannula 2.00 


 


3/6/18 08:00  85      


 


3/6/18 04:00  78      


 


3/6/18 03:52 97.9 80 20 153/89 (110) 96   


 


3/6/18 00:00 98.0 78 20 147/85 (105) 95   


 


3/6/18 00:00  69      


 


3/5/18 20:37      Nasal Cannula 2.00 


 


3/5/18 20:00 98.5 85 19 166/86 (112) 96   


 


3/5/18 20:00      Nasal Cannula 2.00 


 


3/5/18 20:00  66      


 


3/5/18 16:02 97.7 72 18 155/81 (105) 95   


 


3/5/18 16:00  71      


 


3/5/18 12:02 97.3 80 17 151/79 (103) 95   


 


3/5/18 12:00  76      














I/O      


 


 3/5/18 3/5/18 3/5/18 3/6/18 3/6/18 3/6/18





 07:00 15:00 23:00 07:00 15:00 23:00


 


Intake Total 240 ml  600 ml   


 


Output Total 900 ml  800 ml 600 ml  


 


Balance -660 ml  -200 ml -600 ml  


 


      


 


Intake Oral 240 ml  600 ml   


 


Output Urine Total 900 ml  800 ml 600 ml  


 


# Bowel Movements   1   








 (Saskia Garcia MD, R1)


Result Diagram:  


3/6/18 0521                                                                    

            3/6/18 0521





Objective Remarks


GENERAL: This is a well-nourished, well-developed patient, in no apparent 

distress, sitting in on bed. 


SKIN: No rashes, ecchymoses. Cool and dry. Small healing lesion on dorsum of 

Left foot.


HEAD: Atraumatic. Normocephalic.


EYES: Extraocular motions intact. No scleral icterus. No injection or drainage. 


ENT: Nose without bleeding, purulent drainage or septal hematoma. 


NECK: Trachea midline. No JVD or lymphadenopathy. Supple, nontender, no 

meningeal signs. 


CARDIOVASCULAR: Irregular rate and rhythm without murmurs, gallops, or rubs. 


RESPIRATORY: Clear to auscultation BL. No wheezes, rales, or rhonchi.  


GASTROINTESTINAL: Abdomen distended, hard. unable to appreciate fluid shift on 

exam. No hepato-splenomegaly, or palpable masses. No guarding. no hepatojugular 

reflux noted.


MUSCULOSKELETAL: Extremities without clubbing, or cyanosis. improved Right LE 

edema.  No joint tenderness, effusion, or edema noted. No calf tenderness. 

Negative Homans sign bilaterally. 


NEUROLOGICAL: Awake and alert. Cranial nerves II through XII intact.  Motor and 

sensory grossly within normal limits. Five out of 5 muscle strength in all 

muscle groups.  Normal speech.


 (Saskia Garcia MD, R1)





A/P


Assessment and Plan


Patient is a 72- year-old Male with PMHx of non-ischemic cardiomyopathy, CHF, 

HTN, and DM presented to the ED with 1 wk h/o SOB associated with orthopnea. 

Patient found to be in rate controlled new onset atrial fibrillation on EKG. 

Admitted for further w/u of SOB CHF exacerbation vs. atrial fib. Patient is 

stable. S/p myocardial perfusion scan this morning.


Discharge Planning


Pending cardiology clearance


 (Saskia Garcia MD, R1)


Attending Attestation


Patient seen and examined this afternoon.  He's not having any shortness of 

breath, anticipates a walk test.  He did contact his insurance company 

regarding Plavix.  Asymptomatic 16 beat run of V. tach, stress test was read as 

normal.  I agree with exam findings above.  The patient is able to complete his 

walk test, and if cardiology approves, we will discharge him today.  He is 

cautioned to be very cognizant of his sodium intake.  He will discuss the 

possibility of taking Entresto when he follows up with his cardiologist in 

[place].


Patient seen and examined.  Case reviewed and discussed with the resident team.

  Agree with plan of care as discussed with me and documented in the resident 

note.


 (Barb Ellis MD)


Problem List:  


(1) Shortness of breath


ICD Codes:  R06.02 - Shortness of breath


Status:  Acute


Plan:  Patient with c/o 1 wk h/o SOB associated with orthopnea. Orthopnea has 

resolved and SOB has improved significantly per patient


-Pt has PMHx of CHF, HTN and non-ischemic cardiomyopathy with pacer-

defibrillator in place. Denies  previous history afib. 


Denies palpitation or dizziness. Reports improvement of SOB on 2L NC. 








No leukocytosis, troponin x2 neg, 


EKG: Atrial Fibrillation with aberrant conduction or ventricular premature 

complexes. Left axis deviation unchanged from previous EKG in 2016


CXR: Stable cardiomegaly, lungs are clear


CTA: Marked elevation right hemidiaphragm with compressive atelectasis right 

base. Negative for central pulmonary emboli and specific adenopathy.


Echo: EF of 25-30%, global hypokinesis


Abdominal u/s: No acute findings, multiple right renal cyst


TSH and free T4 WNL 





-c/w asa


-Pt placed on telemetry


-Patient with a CHADSVASC SCORE: 4


-will order O2 walk test prior to discharge


-Cardiology consulted, appreciate recommendations


   -HZTZ discontinued and lasix added


   -Patient to continue on Lovenox until he calls insurance to find out what 

type of anticoagulation is covered. Patient stated his insurance and they will 

cover Plavix non-generic. 


   -Considered Entresto, however due to inability for appropriate follow-up 

since patient lives in [place] will defer to his home cardiologist once he 

returns home. 


   -Myocardial perfusion scan: No reversible defects observed to suggest acute 

ischemia. Global hypokinesia with reduced ejection fraction. 








(2) CHF (congestive heart failure)


ICD Codes:  I50.9 - Heart failure, unspecified


Status:  Chronic


Plan:  c/w home medication, coreg 





see chest pain plan above





(3) Hypertension


ICD Codes:  I10 - Essential (primary) hypertension


Status:  Chronic


Plan:  c/w home meds


monitor VS


 





(4) DM (diabetes mellitus)


ICD Codes:  E11.9 - Type 2 diabetes mellitus without complications


Status:  Chronic


Plan:   B on admission








Bedside glucose chks


low dose sliding scale


Last A1C 6.5, per patient


continue to monitor





(5) History of cardiomyopathy


ICD Codes:  Z86.79 - Personal history of other diseases of the circulatory 

system


Status:  Chronic


Plan:  Patient with past medical history of nonischemic cardiomyopathy


Cardiomegaly noted on chest x-ray





-See plan above for SOB





(6) Nutrition, metabolism, and development symptoms


ICD Codes:  R63.8 - Other symptoms and signs concerning food and fluid intake


Plan:  Fluids: Not indicated at this time


Electrolytes: replete as needed, will continue to monitor


Nutrition: heart healthy





DVT ppx: therapeutic Lovenox given


 (Saskia Garcia MD, R1)





Problem Qualifiers





(1) CHF (congestive heart failure):  


Qualified Codes:  I50.9 - Heart failure, unspecified


(2) DM (diabetes mellitus):  








Saskia Garcia MD, R1 Mar 6, 2018 11:24


Barb Ellis MD Mar 6, 2018 15:17

## 2018-03-07 VITALS
RESPIRATION RATE: 17 BRPM | TEMPERATURE: 97.3 F | SYSTOLIC BLOOD PRESSURE: 154 MMHG | HEART RATE: 85 BPM | DIASTOLIC BLOOD PRESSURE: 83 MMHG | OXYGEN SATURATION: 95 %

## 2018-03-07 VITALS
RESPIRATION RATE: 17 BRPM | DIASTOLIC BLOOD PRESSURE: 96 MMHG | OXYGEN SATURATION: 98 % | TEMPERATURE: 98.1 F | HEART RATE: 64 BPM | SYSTOLIC BLOOD PRESSURE: 156 MMHG

## 2018-03-07 VITALS
HEART RATE: 75 BPM | RESPIRATION RATE: 17 BRPM | OXYGEN SATURATION: 96 % | SYSTOLIC BLOOD PRESSURE: 140 MMHG | TEMPERATURE: 97.2 F | DIASTOLIC BLOOD PRESSURE: 89 MMHG

## 2018-03-07 VITALS
SYSTOLIC BLOOD PRESSURE: 158 MMHG | HEART RATE: 86 BPM | DIASTOLIC BLOOD PRESSURE: 84 MMHG | TEMPERATURE: 98 F | RESPIRATION RATE: 17 BRPM | OXYGEN SATURATION: 97 %

## 2018-03-07 RX ADMIN — INSULIN ASPART SCH: 100 INJECTION, SOLUTION INTRAVENOUS; SUBCUTANEOUS at 08:00

## 2018-03-07 RX ADMIN — INSULIN ASPART SCH: 100 INJECTION, SOLUTION INTRAVENOUS; SUBCUTANEOUS at 12:00

## 2018-03-07 RX ADMIN — FUROSEMIDE SCH MG: 20 TABLET ORAL at 08:16

## 2018-03-07 RX ADMIN — APIXABAN SCH MG: 5 TABLET, FILM COATED ORAL at 08:16

## 2018-03-07 RX ADMIN — METOPROLOL TARTRATE SCH MG: 100 TABLET, FILM COATED ORAL at 08:17

## 2018-03-07 RX ADMIN — POTASSIUM CHLORIDE SCH MEQ: 750 TABLET, FILM COATED, EXTENDED RELEASE ORAL at 08:17

## 2018-03-07 RX ADMIN — Medication SCH ML: at 08:12

## 2018-03-07 RX ADMIN — LOSARTAN POTASSIUM SCH MG: 50 TABLET, FILM COATED ORAL at 08:16

## 2018-03-07 RX ADMIN — METOPROLOL TARTRATE SCH MG: 25 TABLET, FILM COATED ORAL at 08:17

## 2018-03-07 NOTE — HHI.FPPN
Subjective


Remarks


Pt seen and examined this morning. Pt didn't feel comfortable leaving last 

night due to orthopnea. This morning states he feels better, and able to walk 

around without any SOB, but just when he lays down flat. Denies any chest pain. 

Doesn't require oxygen based on O2 walk test. Denies any other symptoms and 

feels ready to go home today. 


 (Marty Nicole MD)





Objective


Vitals





Vital Signs








  Date Time  Temp Pulse Resp B/P (MAP) Pulse Ox O2 Delivery O2 Flow Rate FiO2


 


3/7/18 08:11 97.3 85 17 154/83 (106) 95   


 


3/7/18 05:40 98.1 64 17 156/96 (116) 98   


 


3/7/18 04:00      Nasal Cannula 2.00 


 


3/7/18 00:09 98.0 86 17 158/84 (108) 97   


 


3/7/18 00:00      Nasal Cannula 2.00 


 


3/6/18 20:00 98.2 86 18 162/95 (117) 96   


 


3/6/18 20:00      Nasal Cannula 2.00 


 


3/6/18 16:00 97.3 77 20 147/83 (104) 97   


 


3/6/18 16:00  89      


 


3/6/18 13:36     96 Nasal Cannula 2.00 


 


3/6/18 12:00 97.6 91 20 147/76 (99) 96   


 


3/6/18 12:00  98      














I/O      


 


 3/6/18 3/6/18 3/6/18 3/7/18 3/7/18 3/7/18





 07:00 15:00 23:00 07:00 15:00 23:00


 


Intake Total   720 ml   


 


Output Total 600 ml  825 ml 1325 ml  


 


Balance -600 ml  -105 ml -1325 ml  


 


      


 


Intake Oral   720 ml   


 


Output Urine Total 600 ml  825 ml 1325 ml  


 


# Bowel Movements   1   








 (Marty Nicole MD)


Result Diagram:  


3/6/18 0521                                                                    

            3/6/18 0521





Imaging





Last Impressions








Myocardial Perfusion Scan Nuc Med 3/5/18 0000 Signed





Impressions: 





 Service Date/Time:  2018 14:36 - CONCLUSION:  No reversible 





 defects observed to suggest acute ischemia. Global hypokinesia with reduced 





 ejection fraction.  RISK CATEGORY:     High     Segundo Gillespie Jr., MD 


 


CT Angiography 3/4/18 0819 Signed





Impressions: 





 Service Date/Time:  2018 08:43 - CONCLUSION:  Marked 

elevation 





 right hemidiaphragm with compressive atelectasis right base. Negative for 





 central pulmonary emboli Nonspecific adenopathy.      Valerio Singh MD  FACR


 


Chest X-Ray 3/4/18 0642 Signed





Impressions: 





 Service Date/Time:  2018 06:48 - CONCLUSION:  Stable 





 cardiomegaly.  The lungs are clear.     Segundo Patterson MD 


 


Abdomen Ultrasound 3/4/18 0000 Signed





Impressions: 





 Service Date/Time:  2018 19:11 - CONCLUSION:  1. Limited exam 





 due to gaseous bowel. No acute findings. Multiple right renal cysts.     

Brayden Suárez MD 








Objective Remarks


GENERAL: This is a well-nourished, well-developed patient, in no apparent 

distress, sitting in on bed. 


SKIN: No rashes, ecchymoses. Cool and dry. Small healing lesion on dorsum of 

Left foot.


CARDIOVASCULAR: Irregular rate and rhythm without murmurs, gallops, or rubs. 


RESPIRATORY: Clear to auscultation BL. No wheezes, rales, or rhonchi.  


GASTROINTESTINAL: Abdomen distended. No hepato-splenomegaly, or palpable 

masses. 


MUSCULOSKELETAL: Extremities without clubbing, or cyanosis. no edema.


NEUROLOGICAL: Awake and alert. Motor and sensory grossly within normal limits. 

Normal speech.


 (Marty Nicole MD)





A/P


Assessment and Plan


Patient is a 72- year-old Male with PMHx of non-ischemic cardiomyopathy, CHF, 

HTN, and DM presented to the ED with 1 wk h/o SOB associated with orthopnea. 

Patient found to be in rate controlled new onset atrial fibrillation on EKG. 

Admitted for further w/u of SOB CHF exacerbation vs. atrial fib. Patient is 

stable. S/p myocardial perfusion scan this morning.


Discharge Planning


Pending cardiology clearance


 (Marty Nicole MD)


Attending Attestation


Spoke with patient and wife; wife is very anxious and reports that her  

can't complete a sentence or lie down without O2.  Patient was in the BR 

without his 02, came back to room and sat on bed without O2.  has passed his 

walk test.  Plans to go on a cruise on Friday and is very anxious about going 

without O2.  We will provide a script of O2 if pt. wants to purchase it.





Patient seen and examined.  Case reviewed and discussed with the resident team.

  Agree with plan of care as discussed with me and documented in the resident 

note.


 (Barb Ellis MD)


Problem List:  


(1) Shortness of breath


ICD Codes:  R06.02 - Shortness of breath


Status:  Acute


Plan:  Patient with c/o 1 wk h/o SOB associated with orthopnea. 


Pt has PMHx of CHF, HTN and non-ischemic cardiomyopathy with pacer-

defibrillator in place. Denies previous history afib. 


No leukocytosis, troponin x2 neg,  on admission


EKG: Atrial Fibrillation with aberrant conduction or ventricular premature 

complexes. Left axis deviation unchanged from previous EKG in 2016


CXR: Stable cardiomegaly, lungs are clear


CTA: Marked elevation right hemidiaphragm with compressive atelectasis right 

base. Negative for central pulmonary emboli and specific adenopathy.


Echo: EF of 25-30%, global hypokinesis


TSH and free T4 WNL 





-c/w asa


-Pt placed on telemetry


-Patient with a CHADSVASC SCORE: 4


-Cardiology consulted, appreciate recommendations


   -Myocardial perfusion scan: no reversible defects. Global hypokinesia


   -Metoprolol 50mg BID


   -Losartan 50mg daily


   -HZTZ discontinued and lasix added


   -Continue Eliquis


   -Considered Entresto, however due to inability for appropriate follow-up 

since patient lives in [place] will defer to his home cardiologist once he 

returns home. 


   -Myocardial perfusion scan: No reversible defects observed to suggest acute 

ischemia. Global hypokinesia with reduced ejection fraction. 








(2) CHF (congestive heart failure)


ICD Codes:  I50.9 - Heart failure, unspecified


Status:  Chronic


Plan:  c/w home medication, coreg 





see plan above





(3) Hypertension


ICD Codes:  I10 - Essential (primary) hypertension


Status:  Chronic


Plan:  c/w home meds


monitor VS


 





(4) DM (diabetes mellitus)


ICD Codes:  E11.9 - Type 2 diabetes mellitus without complications


Status:  Chronic


Plan:   B on admission





Bedside glucose chks


low dose sliding scale


Last A1C 6.5, per patient


continue to monitor





(5) History of cardiomyopathy


ICD Codes:  Z86.79 - Personal history of other diseases of the circulatory 

system


Status:  Chronic


Plan:  Patient with past medical history of nonischemic cardiomyopathy


Cardiomegaly noted on chest x-ray





-See plan above for SOB





(6) Nutrition, metabolism, and development symptoms


ICD Codes:  R63.8 - Other symptoms and signs concerning food and fluid intake


Plan:  Fluids: Not indicated at this time


Electrolytes: replete as needed, will continue to monitor


Nutrition: heart healthy





DVT ppx: Eliquis


 (Marty Nicole MD)





Problem Qualifiers





(1) CHF (congestive heart failure):  


Qualified Codes:  I50.23 - Acute on chronic systolic (congestive) heart failure


(2) Hypertension:  


Qualified Codes:  I10 - Essential (primary) hypertension


(3) DM (diabetes mellitus):  








Marty Nicole MD Mar 7, 2018 09:58


Barb Ellis MD Mar 7, 2018 14:57

## 2018-03-26 ENCOUNTER — HOSPITAL ENCOUNTER (EMERGENCY)
Dept: HOSPITAL 17 - NEPC | Age: 73
Discharge: HOME | End: 2018-03-26
Payer: MEDICARE

## 2018-03-26 VITALS
HEART RATE: 89 BPM | RESPIRATION RATE: 24 BRPM | DIASTOLIC BLOOD PRESSURE: 96 MMHG | TEMPERATURE: 97.8 F | SYSTOLIC BLOOD PRESSURE: 139 MMHG | OXYGEN SATURATION: 96 %

## 2018-03-26 VITALS — WEIGHT: 209.44 LBS | BODY MASS INDEX: 31.74 KG/M2 | HEIGHT: 68 IN

## 2018-03-26 VITALS — SYSTOLIC BLOOD PRESSURE: 138 MMHG | DIASTOLIC BLOOD PRESSURE: 81 MMHG | TEMPERATURE: 97.9 F

## 2018-03-26 VITALS
HEART RATE: 86 BPM | OXYGEN SATURATION: 96 % | SYSTOLIC BLOOD PRESSURE: 149 MMHG | TEMPERATURE: 97.9 F | DIASTOLIC BLOOD PRESSURE: 86 MMHG | RESPIRATION RATE: 20 BRPM

## 2018-03-26 VITALS
TEMPERATURE: 97.3 F | RESPIRATION RATE: 20 BRPM | HEART RATE: 101 BPM | DIASTOLIC BLOOD PRESSURE: 80 MMHG | SYSTOLIC BLOOD PRESSURE: 183 MMHG | OXYGEN SATURATION: 98 %

## 2018-03-26 VITALS — OXYGEN SATURATION: 96 %

## 2018-03-26 DIAGNOSIS — Z79.01: ICD-10-CM

## 2018-03-26 DIAGNOSIS — Z79.84: ICD-10-CM

## 2018-03-26 DIAGNOSIS — I42.9: ICD-10-CM

## 2018-03-26 DIAGNOSIS — I50.9: ICD-10-CM

## 2018-03-26 DIAGNOSIS — R06.02: Primary | ICD-10-CM

## 2018-03-26 DIAGNOSIS — I11.0: ICD-10-CM

## 2018-03-26 DIAGNOSIS — E11.9: ICD-10-CM

## 2018-03-26 LAB
ALBUMIN SERPL-MCNC: 3.7 GM/DL (ref 3.4–5)
ALP SERPL-CCNC: 82 U/L (ref 45–117)
ALT SERPL-CCNC: 26 U/L (ref 12–78)
AST SERPL-CCNC: 25 U/L (ref 15–37)
BASOPHILS # BLD AUTO: 0 TH/MM3 (ref 0–0.2)
BASOPHILS NFR BLD: 0.7 % (ref 0–2)
BILIRUB SERPL-MCNC: 1.5 MG/DL (ref 0.2–1)
BUN SERPL-MCNC: 22 MG/DL (ref 7–18)
CALCIUM SERPL-MCNC: 9.2 MG/DL (ref 8.5–10.1)
CHLORIDE SERPL-SCNC: 108 MEQ/L (ref 98–107)
CREAT SERPL-MCNC: 1.25 MG/DL (ref 0.6–1.3)
EOSINOPHIL # BLD: 0.2 TH/MM3 (ref 0–0.4)
EOSINOPHIL NFR BLD: 2.4 % (ref 0–4)
ERYTHROCYTE [DISTWIDTH] IN BLOOD BY AUTOMATED COUNT: 13.5 % (ref 11.6–17.2)
GFR SERPLBLD BASED ON 1.73 SQ M-ARVRAT: 57 ML/MIN (ref 89–?)
GLUCOSE SERPL-MCNC: 146 MG/DL (ref 74–106)
HCO3 BLD-SCNC: 25.2 MEQ/L (ref 21–32)
HCT VFR BLD CALC: 47.2 % (ref 39–51)
HGB BLD-MCNC: 16 GM/DL (ref 13–17)
INR PPP: 1.1 RATIO
LYMPHOCYTES # BLD AUTO: 1 TH/MM3 (ref 1–4.8)
LYMPHOCYTES NFR BLD AUTO: 13.2 % (ref 9–44)
MAGNESIUM SERPL-MCNC: 1.9 MG/DL (ref 1.5–2.5)
MCH RBC QN AUTO: 30.2 PG (ref 27–34)
MCHC RBC AUTO-ENTMCNC: 33.8 % (ref 32–36)
MCV RBC AUTO: 89.4 FL (ref 80–100)
MONOCYTE #: 0.7 TH/MM3 (ref 0–0.9)
MONOCYTES NFR BLD: 9.7 % (ref 0–8)
NEUTROPHILS # BLD AUTO: 5.5 TH/MM3 (ref 1.8–7.7)
NEUTROPHILS NFR BLD AUTO: 74 % (ref 16–70)
PLATELET # BLD: 160 TH/MM3 (ref 150–450)
PMV BLD AUTO: 10.4 FL (ref 7–11)
PROT SERPL-MCNC: 7.5 GM/DL (ref 6.4–8.2)
PROTHROMBIN TIME: 10.7 SEC (ref 9.8–11.6)
RBC # BLD AUTO: 5.28 MIL/MM3 (ref 4.5–5.9)
SODIUM SERPL-SCNC: 142 MEQ/L (ref 136–145)
TROPONIN I SERPL-MCNC: 0.04 NG/ML (ref 0.02–0.05)
WBC # BLD AUTO: 7.5 TH/MM3 (ref 4–11)

## 2018-03-26 PROCEDURE — 82550 ASSAY OF CK (CPK): CPT

## 2018-03-26 PROCEDURE — 36600 WITHDRAWAL OF ARTERIAL BLOOD: CPT

## 2018-03-26 PROCEDURE — 71045 X-RAY EXAM CHEST 1 VIEW: CPT

## 2018-03-26 PROCEDURE — 96374 THER/PROPH/DIAG INJ IV PUSH: CPT

## 2018-03-26 PROCEDURE — 83735 ASSAY OF MAGNESIUM: CPT

## 2018-03-26 PROCEDURE — 83880 ASSAY OF NATRIURETIC PEPTIDE: CPT

## 2018-03-26 PROCEDURE — 85730 THROMBOPLASTIN TIME PARTIAL: CPT

## 2018-03-26 PROCEDURE — 82805 BLOOD GASES W/O2 SATURATION: CPT

## 2018-03-26 PROCEDURE — 84484 ASSAY OF TROPONIN QUANT: CPT

## 2018-03-26 PROCEDURE — 85610 PROTHROMBIN TIME: CPT

## 2018-03-26 PROCEDURE — 99285 EMERGENCY DEPT VISIT HI MDM: CPT

## 2018-03-26 PROCEDURE — 80053 COMPREHEN METABOLIC PANEL: CPT

## 2018-03-26 PROCEDURE — 85025 COMPLETE CBC W/AUTO DIFF WBC: CPT

## 2018-03-26 PROCEDURE — 93005 ELECTROCARDIOGRAM TRACING: CPT

## 2018-03-26 NOTE — PD
HPI


.


Dyspnea


Chief Complaint:  Respiratory Symptoms


Time Seen by Provider:  07:21


Travel History


International Travel<30 days:  No


Contact w/Intl Traveler<30days:  No


Traveled to known affect area:  No





History of Present Illness


HPI


This patient presents with a chief complaint of dyspnea.  He has been dyspneic 

for 3 or more weeks.  He was in the hospital in early March for same and states 

that he got better but not completely well.  He states that his symptoms became 

worse again 3 or 4 days ago.  His symptoms are exacerbated by laying down and 

walking around if he walks very far.  Associated symptoms include a mild cough 

but no sputum, no fever and no chest pain.  He feels that his cough is related 

to allergies.  He reports compliance with his medications.





PFSH


Past Medical History


Hx Anticoagulant Therapy:  Yes (ASA 81 PO DAILY)


Arthritis:  Yes


Asthma:  No


Heart Rhythm Problems:  Yes (NON-ISCHEMIC)


Cancer:  No


Cardiomyopathy:  Yes


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 )


High Cholesterol:  No


Chest Pain:  No


Congestive Heart Failure:  Yes


COPD:  No


Diabetes:  Yes (Metformin)


Patient Takes Glucophage:  No


Diminished Hearing:  No


Gastrointestinal Disorders:  Yes (ELEVATED LFT)


GERD:  No


Genitourinary:  No


Hiatal Hernia:  No


Hypertension:  Yes


Immune Disorder:  No


Implanted Vascular Access Dvce:  Yes


Musculoskeletal:  Yes (DDD)


Neurologic:  No


Psychiatric:  No


Reproductive:  Yes (low sperm count)


Respiratory:  Yes


Immunizations Current:  Yes


Sleep Apnea:  No


Ulcer:  No





Past Surgical History


Abdominal Surgery:  Yes (UMBILICAL HERNIA REPAIRED)


AICD:  Yes ([employer] DEBRILLATOR. SERIAL#[id number], MODEL#[id number])


Arteriovenous Shunt:  No


Body Medical Devices:  [employer] VR DEFIB


Cardiac Surgery:  Yes (DEFIBRILLATOR PLACEMENT)


Eye Surgery:  Yes (BILAT CATARACTS)


Genitourinary Surgery:  Yes (VASECTOMY)


Insulin Pump:  No


Joint Replacement:  Yes (LTH)


Oral Surgery:  Yes (SALIVARY GLAND)


Pacemaker:  Yes


Other Surgery:  Yes (HEMORROIDECTOMY,STOMACH WALL REPAIR,VARICOSE VEIN )





Social History


Alcohol Use:  Yes (OCC)


Tobacco Use:  No


Substance Use:  No





Allergies-Medications


(Allergen,Severity, Reaction):  


Coded Allergies:  


     penicillin G (Verified  Allergy, Severe, SYNCOPE, 3/26/18)


Reported Meds & Prescriptions





Reported Meds & Active Scripts


Active


Furosemide 20 Mg Tab 20 Mg PO BID


Metoprolol Tartrate 50 Mg Tab 50 Mg PO Q12HR


Cozaar (Losartan Potassium) 50 Mg Tab 50 Mg PO DAILY


Eliquis (Apixaban) 5 Mg Tab 5 Mg PO BID


Reported


Potassium Chloride ER (Potassium Chloride) 10 Meq Cap 10 Meq PO DAILY


Promethazine Liq (Promethazine HCl) 6.25 Mg/5 Ml Syrp 12.5 Mg PO Q6H PRN


Metformin (Metformin HCl) 1,000 Mg Tab 1,000 Mg PO DAILY


     With a meal


Flomax (Tamsulosin HCl) 0.4 Mg Cap 0.4 Mg PO HS


B-12 (Cyanocobalamin) 2,000 Mcg Tab Unknown Dose PO DAILY








Review of Systems


Except as stated in HPI:  all other systems reviewed are Neg


Cardiovascular:  No: Chest Pain or Discomfort, Palpitations


Respiratory:  Positive: Cough, Shortness of Breath


Musculoskeletal:  No: Edema





Physical Exam


Narrative


GENERAL: He is sitting on the side of the bed.


SKIN:  warm/dry.


HEAD: Normocephalic.  Atraumatic.


EYES: Pupils equal and round. No scleral icterus. No injection or drainage. 


ENT: No nasal bleeding or discharge.  Mucous membranes pink and moist.


NECK: Trachea midline.  Full range of motion without pain.. 


CARDIOVASCULAR: Irregular rhythm with a controlled rate.


RESPIRATORY: No accessory muscle use.  Bibasilar rales. Breath sounds equal 

bilaterally. 


MUSCULOSKELETAL: No obvious deformities.  No peripheral edema.


NEUROLOGICAL: Awake and alert. No obvious cranial nerve deficits.  Motor 

grossly within normal limits. Normal speech.


PSYCHIATRIC: Appropriate mood and affect; insight and judgment normal.





Data


Data


Last Documented VS





Vital Signs








  Date Time  Temp Pulse Resp B/P (MAP) Pulse Ox O2 Delivery O2 Flow Rate FiO2


 


3/26/18 09:37     96   21


 


3/26/18 09:25 97.9 86 20 149/86 (107)  Room Air  








Orders





 Orders


Complete Blood Count With Diff (3/26/18 07:03)


Comprehensive Metabolic Panel (3/26/18 07:03)


B-Type Natriuretic Peptide (3/26/18 07:03)


Act Partial Throm Time (Ptt) (3/26/18 07:03)


Prothrombin Time / Inr (Pt) (3/26/18 07:03)


Magnesium (Mg) (3/26/18 07:03)


Ckmb (Isoenzyme) Profile (3/26/18 07:03)


Troponin I (3/26/18 07:03)


Iv Access Insert/Monitor (3/26/18 07:03)


Electrocardiogram (3/26/18 07:03)


Ecg Monitoring (3/26/18 07:03)


Oximetry (3/26/18 07:03)


Oxygen Administration (3/26/18 07:03)


Chest, Single Ap (3/26/18 07:03)


Sodium Chloride 0.9% Flush (Ns Flush) (3/26/18 07:15)


Furosemide Inj (Lasix Inj) (3/26/18 07:45)


Nitroglycerin 2% Oint (Nitroglycerin 2% (3/26/18 07:45)


Arterial Blood Gas (Abg) (3/26/18 )





Labs





Laboratory Tests








Test


  3/26/18


07:30 3/26/18


09:13


 


White Blood Count 7.5 TH/MM3  


 


Red Blood Count 5.28 MIL/MM3  


 


Hemoglobin 16.0 GM/DL  


 


Hematocrit 47.2 %  


 


Mean Corpuscular Volume 89.4 FL  


 


Mean Corpuscular Hemoglobin 30.2 PG  


 


Mean Corpuscular Hemoglobin


Concent 33.8 % 


  


 


 


Red Cell Distribution Width 13.5 %  


 


Platelet Count 160 TH/MM3  


 


Mean Platelet Volume 10.4 FL  


 


Neutrophils (%) (Auto) 74.0 %  


 


Lymphocytes (%) (Auto) 13.2 %  


 


Monocytes (%) (Auto) 9.7 %  


 


Eosinophils (%) (Auto) 2.4 %  


 


Basophils (%) (Auto) 0.7 %  


 


Neutrophils # (Auto) 5.5 TH/MM3  


 


Lymphocytes # (Auto) 1.0 TH/MM3  


 


Monocytes # (Auto) 0.7 TH/MM3  


 


Eosinophils # (Auto) 0.2 TH/MM3  


 


Basophils # (Auto) 0.0 TH/MM3  


 


CBC Comment DIFF FINAL  


 


Differential Comment   


 


Prothrombin Time 10.7 SEC  


 


Prothromb Time International


Ratio 1.1 RATIO 


  


 


 


Activated Partial


Thromboplast Time 27.5 SEC 


  


 


 


Blood Urea Nitrogen 22 MG/DL  


 


Creatinine 1.25 MG/DL  


 


Random Glucose 146 MG/DL  


 


Total Protein 7.5 GM/DL  


 


Albumin 3.7 GM/DL  


 


Calcium Level 9.2 MG/DL  


 


Magnesium Level 1.9 MG/DL  


 


Alkaline Phosphatase 82 U/L  


 


Aspartate Amino Transf


(AST/SGOT) 25 U/L 


  


 


 


Alanine Aminotransferase


(ALT/SGPT) 26 U/L 


  


 


 


Total Bilirubin 1.5 MG/DL  


 


Sodium Level 142 MEQ/L  


 


Potassium Level 4.4 MEQ/L  


 


Chloride Level 108 MEQ/L  


 


Carbon Dioxide Level 25.2 MEQ/L  


 


Anion Gap 9 MEQ/L  


 


Estimat Glomerular Filtration


Rate 57 ML/MIN 


  


 


 


Total Creatine Kinase 75 U/L  


 


Troponin I 0.04 NG/ML  


 


B-Type Natriuretic Peptide 228 PG/ML  


 


Blood Gas Puncture Site  LT RADIAL 


 


Blood Gas Patient Temperature  98.6 


 


Blood Gas HCO3  26 mmol/L 


 


Blood Gas Base Excess  1.9 mmol/L 


 


Blood Gas Oxygen Saturation  94 % 


 


Arterial Blood pH  7.43 


 


Arterial Blood Partial


Pressure CO2 


  40 mmHg 


 


 


Arterial Blood Partial


Pressure O2 


  83 mmHg 


 


 


Arterial Blood Oxygen Content  21.2 Vol % 


 


Arterial Blood


Carboxyhemoglobin 


  1.4 % 


 


 


Arterial Blood Methemoglobin  0.7 % 


 


Blood Gas Hemoglobin  15.9 G/DL 


 


Oxygen Delivery Device  ESTEFANIA MONTEZ


Medical Decision Making


Medical Screen Exam Complete:  Yes


Emergency Medical Condition:  Yes


Medical Record Reviewed:  Yes (Patient was admitted here on 3/4 with new onset 

A. fib and CHF.  He was discharged on Eliquis and Lasix 20 mg twice a day.  

Other medical problems include hypertension, diabetes and nonischemic 

cardiomyopathy.)


Interpretation(s)


EKG shows atrial fibrillation with a rate of 107.  No acute STT wave changes.  

EKG is unchanged from previous.


Differential Diagnosis


Differential diagnosis of dyspnea includes but is not limited to congestive 

heart failure, pneumonia, wheezing, pneumothorax, pulmonary embolism


Narrative Course


This patient presents complaining with dyspnea.  He has a history of atrial 

fibrillation and cardiomyopathy.  Exam shows bibasilar rales.





The patient was treated with Lasix 80 mg IV and Nitropaste 1 inch.





Chest x-ray shows cardiomegaly but clear lungs.  The chest x-ray was 

independently reviewed by me.








ABG








Test


  3/26/18


09:13


 


Arterial Blood


Carboxyhemoglobin 1.4 %  


 


 


Arterial Blood Methemoglobin 0.7 %  


 


Arterial Blood Oxygen Content 21.2 Vol %  H


 


Arterial Blood Partial


Pressure CO2 40 mmHg  


 


 


Arterial Blood Partial


Pressure O2 83 mmHg  


 


 


Arterial Blood pH 7.43  H


 


Blood Gas Base Excess 1.9 mmol/L  


 


Blood Gas HCO3 26 mmol/L  


 


Blood Gas Hemoglobin 15.9 G/DL  


 


Blood Gas Oxygen Saturation 94 %  


 


Oxygen Delivery Device ESTEFANIA  








CBC & BMP Diagram


3/26/18 07:30








Total Protein 7.5, Albumin 3.7, Calcium Level 9.2, Magnesium Level 1.9, 

Alkaline Phosphatase 82, Aspartate Amino Transf (AST/SGOT) 25, Alanine 

Aminotransferase (ALT/SGPT) 26, Total Bilirubin 1.5 H





trop 0.04











This patient presents with dyspnea.  His radiographic and laboratory workup is 

benign.  I feel that he is safe for discharge to home.





Diagnosis





 Primary Impression:  


 Shortness of breath


Patient Instructions:  Dyspnea (DC), General Instructions


Scripts


Potassium Chloride ER (Potassium Chloride ER) 10 Meq Cap


20 MEQ PO DAILY for Electrolyte Replacement, #30 CAP 0 Refills


   Prov: Kristi Arevalo MD         3/26/18 


Furosemide (Furosemide) 20 Mg Tab


40 MG PO BID, #60 TAB 1 Refill


   Prov: Kristi Arevalo MD         3/26/18


Disposition:  01 DISCHARGE HOME


Condition:  Stable











Kristi Arevalo MD Mar 26, 2018 08:24

## 2018-03-26 NOTE — RADRPT
EXAM DATE/TIME:  03/26/2018 07:23 

 

[facility] COMPARISON:     

CHEST SINGLE AP, March 04, 2018, 6:48.

 

                     

INDICATIONS :     

Short of breath with wheezing.

                     

 

MEDICAL HISTORY :     

Congestive heart failure.  Myocardial infarction.        

 

SURGICAL HISTORY :     

Pacemaker.   

 

ENCOUNTER:     

Initial                                        

 

ACUITY:     

2 days      

 

PAIN SCORE:     

0/10

 

LOCATION:     

Bilateral chest 

 

FINDINGS:     

There is a pacing/AICD device in place from the left subclavian approach. The heart size is enlarged.
 The lungs are clear. A significant effusion is not seen. There is some persistent elevation of the r
ight hemidiaphragm.

 

CONCLUSION:     Cardiomegaly.

 

 

 

 Nawaf Funes MD on March 26, 2018 at 7:39           

Board Certified Radiologist.

 This report was verified electronically.

## 2018-03-27 NOTE — EKG
Date Performed: 03/26/2018       Time Performed: 06:44:24

 

PTAGE:      72 years

 

EKG:      ATRIAL FIBRILLATION WITH RAPID VENTRICULAR RESPONSE MARKED LEFT AXIS DEVIATION MINIMAL VOLT
AGE CRITERIA FOR LVH, CONSIDER NORMAL VARIANT ANTEROSEPTAL MYOCARDIAL INFARCTION ABNORMAL ECG 

 

NO PREVIOUS TRACING            

 

DOCTOR:   Yoly Wei  Interpretating Date/Time  03/27/2018 00:05:14

## 2018-03-31 ENCOUNTER — HOSPITAL ENCOUNTER (EMERGENCY)
Dept: HOSPITAL 17 - NEPC | Age: 73
Discharge: HOME | End: 2018-03-31
Payer: MEDICARE

## 2018-03-31 VITALS
SYSTOLIC BLOOD PRESSURE: 131 MMHG | HEART RATE: 81 BPM | OXYGEN SATURATION: 95 % | RESPIRATION RATE: 18 BRPM | DIASTOLIC BLOOD PRESSURE: 73 MMHG

## 2018-03-31 VITALS
HEART RATE: 90 BPM | DIASTOLIC BLOOD PRESSURE: 70 MMHG | SYSTOLIC BLOOD PRESSURE: 133 MMHG | RESPIRATION RATE: 21 BRPM | OXYGEN SATURATION: 95 %

## 2018-03-31 VITALS
RESPIRATION RATE: 21 BRPM | OXYGEN SATURATION: 96 % | DIASTOLIC BLOOD PRESSURE: 83 MMHG | HEART RATE: 84 BPM | SYSTOLIC BLOOD PRESSURE: 149 MMHG

## 2018-03-31 VITALS — WEIGHT: 209.44 LBS | HEIGHT: 68 IN | BODY MASS INDEX: 31.74 KG/M2

## 2018-03-31 VITALS
DIASTOLIC BLOOD PRESSURE: 73 MMHG | RESPIRATION RATE: 20 BRPM | OXYGEN SATURATION: 93 % | TEMPERATURE: 97.5 F | SYSTOLIC BLOOD PRESSURE: 141 MMHG | HEART RATE: 101 BPM

## 2018-03-31 DIAGNOSIS — I50.20: ICD-10-CM

## 2018-03-31 DIAGNOSIS — I48.91: ICD-10-CM

## 2018-03-31 DIAGNOSIS — E11.9: ICD-10-CM

## 2018-03-31 DIAGNOSIS — I11.0: Primary | ICD-10-CM

## 2018-03-31 DIAGNOSIS — Z95.810: ICD-10-CM

## 2018-03-31 DIAGNOSIS — I42.9: ICD-10-CM

## 2018-03-31 DIAGNOSIS — R94.31: ICD-10-CM

## 2018-03-31 LAB
ALBUMIN SERPL-MCNC: 3.4 GM/DL (ref 3.4–5)
ALP SERPL-CCNC: 77 U/L (ref 45–117)
ALT SERPL-CCNC: 24 U/L (ref 12–78)
AST SERPL-CCNC: 18 U/L (ref 15–37)
BASOPHILS # BLD AUTO: 0 TH/MM3 (ref 0–0.2)
BASOPHILS NFR BLD: 0.6 % (ref 0–2)
BILIRUB SERPL-MCNC: 1.6 MG/DL (ref 0.2–1)
BUN SERPL-MCNC: 25 MG/DL (ref 7–18)
CALCIUM SERPL-MCNC: 8.9 MG/DL (ref 8.5–10.1)
CHLORIDE SERPL-SCNC: 109 MEQ/L (ref 98–107)
CREAT SERPL-MCNC: 1.27 MG/DL (ref 0.6–1.3)
EOSINOPHIL # BLD: 0.2 TH/MM3 (ref 0–0.4)
EOSINOPHIL NFR BLD: 2.6 % (ref 0–4)
ERYTHROCYTE [DISTWIDTH] IN BLOOD BY AUTOMATED COUNT: 13.7 % (ref 11.6–17.2)
GFR SERPLBLD BASED ON 1.73 SQ M-ARVRAT: 56 ML/MIN (ref 89–?)
GLUCOSE SERPL-MCNC: 143 MG/DL (ref 74–106)
HCO3 BLD-SCNC: 27.2 MEQ/L (ref 21–32)
HCT VFR BLD CALC: 45.9 % (ref 39–51)
HGB BLD-MCNC: 15.7 GM/DL (ref 13–17)
INR PPP: 1 RATIO
LYMPHOCYTES # BLD AUTO: 0.8 TH/MM3 (ref 1–4.8)
LYMPHOCYTES NFR BLD AUTO: 11.6 % (ref 9–44)
MAGNESIUM SERPL-MCNC: 1.9 MG/DL (ref 1.5–2.5)
MCH RBC QN AUTO: 30.6 PG (ref 27–34)
MCHC RBC AUTO-ENTMCNC: 34.3 % (ref 32–36)
MCV RBC AUTO: 89.2 FL (ref 80–100)
MONOCYTE #: 0.7 TH/MM3 (ref 0–0.9)
MONOCYTES NFR BLD: 9.6 % (ref 0–8)
NEUTROPHILS # BLD AUTO: 5.2 TH/MM3 (ref 1.8–7.7)
NEUTROPHILS NFR BLD AUTO: 75.6 % (ref 16–70)
PLATELET # BLD: 158 TH/MM3 (ref 150–450)
PMV BLD AUTO: 9.5 FL (ref 7–11)
PROT SERPL-MCNC: 7 GM/DL (ref 6.4–8.2)
PROTHROMBIN TIME: 10.6 SEC (ref 9.8–11.6)
RBC # BLD AUTO: 5.14 MIL/MM3 (ref 4.5–5.9)
SODIUM SERPL-SCNC: 141 MEQ/L (ref 136–145)
TROPONIN I SERPL-MCNC: 0.03 NG/ML (ref 0.02–0.05)
WBC # BLD AUTO: 6.8 TH/MM3 (ref 4–11)

## 2018-03-31 PROCEDURE — 85025 COMPLETE CBC W/AUTO DIFF WBC: CPT

## 2018-03-31 PROCEDURE — 85730 THROMBOPLASTIN TIME PARTIAL: CPT

## 2018-03-31 PROCEDURE — 82550 ASSAY OF CK (CPK): CPT

## 2018-03-31 PROCEDURE — 99285 EMERGENCY DEPT VISIT HI MDM: CPT

## 2018-03-31 PROCEDURE — 80053 COMPREHEN METABOLIC PANEL: CPT

## 2018-03-31 PROCEDURE — 93005 ELECTROCARDIOGRAM TRACING: CPT

## 2018-03-31 PROCEDURE — 85610 PROTHROMBIN TIME: CPT

## 2018-03-31 PROCEDURE — 84484 ASSAY OF TROPONIN QUANT: CPT

## 2018-03-31 PROCEDURE — 71045 X-RAY EXAM CHEST 1 VIEW: CPT

## 2018-03-31 PROCEDURE — 83735 ASSAY OF MAGNESIUM: CPT

## 2018-03-31 PROCEDURE — 83880 ASSAY OF NATRIURETIC PEPTIDE: CPT

## 2018-03-31 NOTE — PD
HPI


Chief Complaint:  Respiratory Symptoms


Time Seen by Provider:  12:10


Travel History


International Travel<30 days:  No


Contact w/Intl Traveler<30days:  No


Traveled to known affect area:  No





History of Present Illness


HPI


Patient is a 72-year-old male presenting to emergency room for evaluation of 

shortness of breath.  Patient states this started near the end of February, he 

reports being hospitalized in the beginning of March for 3 days.  He also 

reports being in the emergency department last week with the same symptoms.  He 

reports that his Lasix and potassium was just doubled.  Despite this he reports 

a 4 pound weight gain in the last 2 days.  He reports compliance with 

medications, decrease sodium intake and fluid restriction.  Patient denies any 

chest pain, nausea, vomiting.  He does report orthopnea stating that he usually 

lays on his side but he is unable to.  Patient states he normally can ride his 

bike for several hours however he was only able to write it for 30 minutes 

yesterday.  He also states that when he talks he has to stop his sentences 

because he gets winded.  Symptom onset was several weeks ago, symptom severity 

is mild to moderate, there are no alleviating factors.  Patient does not have a 

primary care doctor in the area, he is followed by Dr. Herzog for 

cardiology.





PFSH


Past Medical History


Hx Anticoagulant Therapy:  Yes (ASA 81 PO DAILY)


Arthritis:  Yes


Atrial Fibrillation:  Yes


Cardiomyopathy:  Yes


Congestive Heart Failure:  Yes


Diabetes:  Yes


Patient Takes Glucophage:  Yes


Hypertension:  Yes


Reproductive:  Yes (low sperm count)


Respiratory:  Yes


Immunizations Current:  Yes


Ulcer:  No





Past Surgical History


Abdominal Surgery:  Yes (UMBILICAL HERNIA REPAIRED)


AICD:  Yes ([employer]  DEBRILLATOR. SERIAL#[id number], MODEL#[id number])


Eye Surgery:  Yes (BILAT CATARACTS)


Genitourinary Surgery:  Yes (VASECTOMY)


Joint Replacement:  Yes (LTH)


Oral Surgery:  Yes (SALIVARY GLAND)


Pacemaker:  Yes


Other Surgery:  Yes (HEMORROIDECTOMY,STOMACH WALL REPAIR,VARICOSE VEIN )





Social History


Alcohol Use:  Yes (OCC)


Tobacco Use:  No


Substance Use:  No





Allergies-Medications


(Allergen,Severity, Reaction):  


Coded Allergies:  


     penicillin G (Verified  Allergy, Severe, SYNCOPE, 3/26/18)


Reported Meds & Prescriptions





Reported Meds & Active Scripts


Active


Potassium Chloride ER (Potassium Chloride) 10 Meq Cap 20 Meq PO DAILY


Furosemide 20 Mg Tab 40 Mg PO BID


Metoprolol Tartrate 50 Mg Tab 50 Mg PO Q12HR


Cozaar (Losartan Potassium) 50 Mg Tab 50 Mg PO DAILY


Eliquis (Apixaban) 5 Mg Tab 5 Mg PO BID


Reported


Promethazine Liq (Promethazine HCl) 6.25 Mg/5 Ml Syrp 12.5 Mg PO Q6H PRN


Metformin (Metformin HCl) 1,000 Mg Tab 1,000 Mg PO DAILY


     With a meal


Flomax (Tamsulosin HCl) 0.4 Mg Cap 0.4 Mg PO HS


B-12 (Cyanocobalamin) 2,000 Mcg Tab Unknown Dose PO DAILY








Review of Systems


Except as stated in HPI:  all other systems reviewed are Neg


Eyes:  No: Blurred Vision


HENT:  No: Headaches


Cardiovascular:  No: Chest Pain or Discomfort, Edema


Respiratory:  Positive: Shortness of Breath, Orthopnea


Gastrointestinal:  Positive: Other (Abdominal bloating), No: Nausea, Abdominal 

Pain


Musculoskeletal:  No: Myalgias


Neurologic:  No: Weakness, Dizziness, Syncope, Focal Abnormalities





Physical Exam


Narrative


GENERAL: Well-developed, well-nourished, alert elderly  male.  

Presenting in no acute distress.


SKIN: Warm and dry.


HEAD: Atraumatic. Normocephalic. 


EYES: Pupils equal and round. No scleral icterus. No injection or drainage. 


ENT: No nasal bleeding or discharge.  Mucous membranes pink and moist.


NECK: Trachea midline. No JVD. 


CARDIOVASCULAR: Irregularly irregular


RESPIRATORY: No accessory muscle use. Clear to auscultation. Breath sounds 

equal bilaterally, diminished in bases. 


GASTROINTESTINAL: Abdomen soft, non-tender, mildly distended. Hepatic and 

splenic margins not palpable.  Positive bowel sounds, no rebound, no guarding


MUSCULOSKELETAL: Extremities without clubbing, cyanosis, or edema. No obvious 

deformities. 


NEUROLOGICAL: Awake and alert. No obvious cranial nerve deficits.  Motor 

grossly within normal limits. Five out of 5 muscle strength in the arms and 

legs.  Normal speech.


PSYCHIATRIC: Appropriate mood and affect; insight and judgment normal.





Data


Data


Last Documented VS





Vital Signs








  Date Time  Temp Pulse Resp B/P (MAP) Pulse Ox O2 Delivery O2 Flow Rate FiO2


 


3/31/18 15:07     95 Nasal Cannula 2.00 


 


3/31/18 15:00  84 21 149/83 (105)    


 


3/31/18 12:02 97.5       








Orders





 Orders


Complete Blood Count With Diff (3/31/18 12:15)


Comprehensive Metabolic Panel (3/31/18 12:15)


B-Type Natriuretic Peptide (3/31/18 12:15)


Act Partial Throm Time (Ptt) (3/31/18 12:15)


Prothrombin Time / Inr (Pt) (3/31/18 12:15)


Magnesium (Mg) (3/31/18 12:15)


Ckmb (Isoenzyme) Profile (3/31/18 12:15)


Troponin I (3/31/18 12:15)


Iv Access Insert/Monitor (3/31/18 12:15)


Electrocardiogram (3/31/18 12:15)


Ecg Monitoring (3/31/18 12:15)


Oximetry (3/31/18 12:15)


Oxygen Administration (3/31/18 12:15)


Chest, Single Ap (3/31/18 12:15)


Sodium Chloride 0.9% Flush (Ns Flush) (3/31/18 12:15)





Labs





Laboratory Tests








Test


  3/31/18


12:45


 


White Blood Count 6.8 TH/MM3 


 


Red Blood Count 5.14 MIL/MM3 


 


Hemoglobin 15.7 GM/DL 


 


Hematocrit 45.9 % 


 


Mean Corpuscular Volume 89.2 FL 


 


Mean Corpuscular Hemoglobin 30.6 PG 


 


Mean Corpuscular Hemoglobin


Concent 34.3 % 


 


 


Red Cell Distribution Width 13.7 % 


 


Platelet Count 158 TH/MM3 


 


Mean Platelet Volume 9.5 FL 


 


Neutrophils (%) (Auto) 75.6 % 


 


Lymphocytes (%) (Auto) 11.6 % 


 


Monocytes (%) (Auto) 9.6 % 


 


Eosinophils (%) (Auto) 2.6 % 


 


Basophils (%) (Auto) 0.6 % 


 


Neutrophils # (Auto) 5.2 TH/MM3 


 


Lymphocytes # (Auto) 0.8 TH/MM3 


 


Monocytes # (Auto) 0.7 TH/MM3 


 


Eosinophils # (Auto) 0.2 TH/MM3 


 


Basophils # (Auto) 0.0 TH/MM3 


 


CBC Comment DIFF FINAL 


 


Differential Comment  


 


Prothrombin Time 10.6 SEC 


 


Prothromb Time International


Ratio 1.0 RATIO 


 


 


Activated Partial


Thromboplast Time 25.9 SEC 


 


 


Blood Urea Nitrogen 25 MG/DL 


 


Creatinine 1.27 MG/DL 


 


Random Glucose 143 MG/DL 


 


Total Protein 7.0 GM/DL 


 


Albumin 3.4 GM/DL 


 


Calcium Level 8.9 MG/DL 


 


Magnesium Level 1.9 MG/DL 


 


Alkaline Phosphatase 77 U/L 


 


Aspartate Amino Transf


(AST/SGOT) 18 U/L 


 


 


Alanine Aminotransferase


(ALT/SGPT) 24 U/L 


 


 


Total Bilirubin 1.6 MG/DL 


 


Sodium Level 141 MEQ/L 


 


Potassium Level 4.1 MEQ/L 


 


Chloride Level 109 MEQ/L 


 


Carbon Dioxide Level 27.2 MEQ/L 


 


Anion Gap 5 MEQ/L 


 


Estimat Glomerular Filtration


Rate 56 ML/MIN 


 


 


Total Creatine Kinase 55 U/L 


 


Troponin I 0.03 NG/ML 


 


B-Type Natriuretic Peptide 254 PG/ML 











MDM


Medical Decision Making


Medical Screen Exam Complete:  Yes


Emergency Medical Condition:  Yes


Medical Record Reviewed:  Yes


Interpretation(s)





Vital Signs








  Date Time  Temp Pulse Resp B/P (MAP) Pulse Ox O2 Delivery O2 Flow Rate FiO2


 


3/31/18 12:52     94 Room Air  


 


3/31/18 12:02 97.5 101 20 141/73 (95) 93   











Last Impressions








Chest X-Ray 3/31/18 1215 Signed





Impressions: 





 Service Date/Time:  Saturday, March 31, 2018 12:46 - CONCLUSION: No acute 





 disease.       Yony Estes MD 








Laboratory Tests








Test


  3/31/18


12:45


 


White Blood Count 6.8 TH/MM3 


 


Red Blood Count 5.14 MIL/MM3 


 


Hemoglobin 15.7 GM/DL 


 


Hematocrit 45.9 % 


 


Mean Corpuscular Volume 89.2 FL 


 


Mean Corpuscular Hemoglobin 30.6 PG 


 


Mean Corpuscular Hemoglobin


Concent 34.3 % 


 


 


Red Cell Distribution Width 13.7 % 


 


Platelet Count 158 TH/MM3 


 


Mean Platelet Volume 9.5 FL 


 


Neutrophils (%) (Auto) 75.6 % 


 


Lymphocytes (%) (Auto) 11.6 % 


 


Monocytes (%) (Auto) 9.6 % 


 


Eosinophils (%) (Auto) 2.6 % 


 


Basophils (%) (Auto) 0.6 % 


 


Neutrophils # (Auto) 5.2 TH/MM3 


 


Lymphocytes # (Auto) 0.8 TH/MM3 


 


Monocytes # (Auto) 0.7 TH/MM3 


 


Eosinophils # (Auto) 0.2 TH/MM3 


 


Basophils # (Auto) 0.0 TH/MM3 


 


CBC Comment DIFF FINAL 


 


Differential Comment  


 


Prothrombin Time 10.6 SEC 


 


Prothromb Time International


Ratio 1.0 RATIO 


 


 


Activated Partial


Thromboplast Time 25.9 SEC 


 


 


Blood Urea Nitrogen 25 MG/DL 


 


Creatinine 1.27 MG/DL 


 


Random Glucose 143 MG/DL 


 


Total Protein 7.0 GM/DL 


 


Albumin 3.4 GM/DL 


 


Calcium Level 8.9 MG/DL 


 


Magnesium Level 1.9 MG/DL 


 


Alkaline Phosphatase 77 U/L 


 


Aspartate Amino Transf


(AST/SGOT) 18 U/L 


 


 


Alanine Aminotransferase


(ALT/SGPT) 24 U/L 


 


 


Total Bilirubin 1.6 MG/DL 


 


Sodium Level 141 MEQ/L 


 


Potassium Level 4.1 MEQ/L 


 


Chloride Level 109 MEQ/L 


 


Carbon Dioxide Level 27.2 MEQ/L 


 


Anion Gap 5 MEQ/L 


 


Estimat Glomerular Filtration


Rate 56 ML/MIN 


 


 


Total Creatine Kinase 55 U/L 


 


Troponin I 0.03 NG/ML 


 


B-Type Natriuretic Peptide 254 PG/ML 








Differential Diagnosis


CHF versus deconditioning versus metabolic abnormality versus arrhythmia versus 

pneumonia versus other


Narrative Course


Patient presented with complaint of shortness of breath.  Medical records 

reviewed.  He was here on March 26 with an essentially negative workup.  On 

March 5 he had a negative nuclear med stress test, ejection fraction is 28%.  

Labs and imaging ordered and pending.


CBC with no acute findings,


Chemistry with no acute findings


Cardiac enzymes are negative 1 set


BNP is 254, this is slightly elevated compared to prior value of 228 


Chest x-ray shows no acute disease.


Patient is followed by Dr. Herzog, Dr. Morgan was on call for cardiology 

today and case was discussed with him.  He was advised on lab findings, the 

results of the nuclear med stress test at the beginning of March, patient's 

presenting complaint and physical exam.  He recommended patient be placed on 

Entresto 24mg/26mg.  He stated that he would notify Dr. Herzog.  Discussed 

plan of care with patient and his wife, they are pleased and agreeable.  They 

were advised to follow-up in the office in 2-3 days.  They are advised to 

return to emergency department immediately for any new or worsening symptoms.





Diagnosis





 Primary Impression:  


 Systolic heart failure


 Qualified Codes:  I50.20 - Unspecified systolic (congestive) heart failure


Referrals:  


Brandy Herzog MD


2 days


Call Monday morning to schedule a follow-up appointment


Patient Instructions:  General Instructions, Heart Failure (ED), Heart Healthy 

Diet (ED)





***Additional Instructions:  


Follow-up with Dr. Herzog in 2-3 days, call Monday morning to schedule a 

follow-up appointment


Discontinue losartan


Start Entresto twice daily as prescribed


Return to emergency department for any new or worsening symptoms


Weigh yourself daily


***Med/Other Pt SpecificInfo:  Prescription(s) given


Scripts


Sacubitril-Valsartan (Entresto) 24-26 Mg Tab


1 TAB PO BID for Heart Failure, #60 TAB 0 Refills


   Prov: Carline Funez         3/31/18


Disposition:  01 DISCHARGE HOME


Condition:  Stable











Carline Funez Mar 31, 2018 13:01

## 2018-03-31 NOTE — PD
Physical Exam


Date Seen by Provider:  Mar 31, 2018


Narrative


This patient presents with shortness of breath and especially orthopnea.  This.

  His workups have been basically negative.  He does have a very poor ejection 

fraction.





Data


Data


Last Documented VS





Vital Signs








  Date Time  Temp Pulse Resp B/P (MAP) Pulse Ox O2 Delivery O2 Flow Rate FiO2


 


3/31/18 12:02 97.5 101 20 141/73 (95) 93   








Orders





 Orders


Complete Blood Count With Diff (3/31/18 12:15)


Comprehensive Metabolic Panel (3/31/18 12:15)


B-Type Natriuretic Peptide (3/31/18 12:15)


Act Partial Throm Time (Ptt) (3/31/18 12:15)


Prothrombin Time / Inr (Pt) (3/31/18 12:15)


Magnesium (Mg) (3/31/18 12:15)


Ckmb (Isoenzyme) Profile (3/31/18 12:15)


Troponin I (3/31/18 12:15)


Iv Access Insert/Monitor (3/31/18 12:15)


Electrocardiogram (3/31/18 12:15)


Ecg Monitoring (3/31/18 12:15)


Oximetry (3/31/18 12:15)


Oxygen Administration (3/31/18 12:15)


Chest, Single Ap (3/31/18 12:15)


Sodium Chloride 0.9% Flush (Ns Flush) (3/31/18 12:15)








MDM


Supervised Visit with SHIV:  Yes


Narrative Course


I, Dr. Arevalo, have reviewed the advance practice practitioner's documentation 

and am in agreement, met with the patient face to face, made the diagnosis, and 

the medical decision making was done by me.  





*My assessment and Findings: Lungs are clear and his sats are in the mid upper 

90s.





Please see Carline Monet NP's  note for results of laboratory and radiographic 

evaluation, ED course, final diagnosis and disposition











Kristi Arevalo MD Mar 31, 2018 12:44

## 2018-03-31 NOTE — RADRPT
EXAM DATE/TIME:  03/31/2018 12:46 

 

[facility] COMPARISON:     

CHEST SINGLE AP, March 26, 2018, 7:23.

 

                     

INDICATIONS :     

Shortness of breath. 

                     

 

MEDICAL HISTORY :            

Congestive heart failure. Myocardial infarction.   

 

SURGICAL HISTORY :        

Pacemaker/Defib.

 

ENCOUNTER:     

Initial                                        

 

ACUITY:     

1 week      

 

PAIN SCORE:     

0/10

 

LOCATION:      

chest 

 

FINDINGS:     

A single view of the chest demonstrates the lungs to be symmetrically aerated without evidence of mas
s, infiltrate or effusion. The left subclavian transvenous pacer remains in place. The right hemidiap
hragm remains mildly elevated. The heart size remains mildly prominent with no perihilar edema. [place]
us structures are intact.

 

CONCLUSION:     No acute disease.  

 

 

 

 Yony Estes MD on March 31, 2018 at 13:07           

Board Certified Radiologist.

 This report was verified electronically.